# Patient Record
Sex: FEMALE | Race: BLACK OR AFRICAN AMERICAN | NOT HISPANIC OR LATINO | Employment: FULL TIME | URBAN - NONMETROPOLITAN AREA
[De-identification: names, ages, dates, MRNs, and addresses within clinical notes are randomized per-mention and may not be internally consistent; named-entity substitution may affect disease eponyms.]

---

## 2022-04-04 ENCOUNTER — OFFICE VISIT (OUTPATIENT)
Dept: FAMILY MEDICINE CLINIC | Facility: CLINIC | Age: 32
End: 2022-04-04

## 2022-04-04 VITALS
TEMPERATURE: 97.7 F | OXYGEN SATURATION: 98 % | HEART RATE: 94 BPM | WEIGHT: 293 LBS | HEIGHT: 66 IN | DIASTOLIC BLOOD PRESSURE: 86 MMHG | SYSTOLIC BLOOD PRESSURE: 134 MMHG | BODY MASS INDEX: 47.09 KG/M2

## 2022-04-04 DIAGNOSIS — R73.03 PRE-DIABETES: Primary | ICD-10-CM

## 2022-04-04 DIAGNOSIS — E66.01 MORBID OBESITY: ICD-10-CM

## 2022-04-04 PROBLEM — R41.840 INATTENTION: Status: ACTIVE | Noted: 2022-04-04

## 2022-04-04 PROCEDURE — 99213 OFFICE O/P EST LOW 20 MIN: CPT | Performed by: FAMILY MEDICINE

## 2022-04-04 RX ORDER — OMEPRAZOLE 40 MG/1
1 CAPSULE, DELAYED RELEASE ORAL
COMMUNITY
Start: 2022-01-31 | End: 2023-01-03

## 2022-04-04 NOTE — ASSESSMENT & PLAN NOTE
Patient will have A1c repeated today.  If this has worsened we discussed trying Metformin extended release.

## 2022-04-04 NOTE — ASSESSMENT & PLAN NOTE
Patient's (Body mass index is 70.34 kg/m².) indicates that they are morbidly obese (BMI > 40 or > 35 with obesity - related health condition) with health conditions that include diabetes mellitus . Weight is worsening. BMI is is above average; BMI management plan is completed. We discussed low calorie, low carb based diet program, portion control and increasing exercise.

## 2022-04-04 NOTE — PROGRESS NOTES
Date: 2022   Patient Name: Nelson Rendon  : 1990   MRN: [id number]     Chief Complaint:    Chief Complaint   Patient presents with   • Diabetes     Patient stopped ozempic on 2022 due to nausea and vaginal discharge       History of Present Illness: Nelson Rendon is a 31 y.o. female who is here today to follow up for Obesity.  Patient started Saxenda .  She completed 1 month of 0.25 mg and increase 0.5 mg but reports significant nausea and vaginal discharge.  She did not like how this made her feel so she recently discontinued.  She has an appointment with [facility] to meet with the nutritionist and discussed a diet plan.  At this point she is not interested in bariatric surgery.           Review of Systems:   Review of Systems   Constitutional: Negative for chills, fatigue and fever.   Respiratory: Negative for cough and shortness of breath.    Cardiovascular: Negative for chest pain and palpitations.   Gastrointestinal: Negative for abdominal pain, constipation, diarrhea, nausea and vomiting.   Musculoskeletal: Negative for back pain and myalgias.   Neurological: Negative for dizziness and headache.   Psychiatric/Behavioral: Negative for depressed mood. The patient is not nervous/anxious.        Past Medical History:   Past Medical History:   Diagnosis Date   • GERD (gastroesophageal reflux disease)    • Obesity        Past Surgical History:   Past Surgical History:   Procedure Laterality Date   • CHOLECYSTECTOMY         Family History:   Family History   Problem Relation Age of Onset   • Hypertension Father    • Obesity Father    • Diabetes Brother    • Obesity Brother    • Hypertension Brother    • Diabetes Other    • Obesity Other        Social History:   Social History     Socioeconomic History   • Marital status: Single   Tobacco Use   • Smoking status: Never Smoker   • Smokeless tobacco: Never Used   Vaping Use   • Vaping Use: Never used   Substance and  "Sexual Activity   • Alcohol use: Yes     Alcohol/week: 3.0 standard drinks     Types: 3 Glasses of wine per week   • Drug use: Never   • Sexual activity: Defer       Medications:     Current Outpatient Medications:   •  omeprazole (priLOSEC) 40 MG capsule, Take 1 capsule by mouth., Disp: , Rfl:     Allergies:   No Known Allergies    PHQ-2 Total Score: 0   PHQ-9 Total Score: 0     Physical Exam:  Vital Signs:   Vitals:    04/04/22 1507   BP: 134/86   BP Location: Left arm   Patient Position: Sitting   Cuff Size: Large Adult   Pulse: 94   Temp: 97.7 °F (36.5 °C)   TempSrc: Temporal   SpO2: 98%   Weight: (!) 198 kg (435 lb 12.8 oz)   Height: 167.6 cm (66\")   PainSc: 0-No pain     Body mass index is 70.34 kg/m².     Physical Exam  Vitals and nursing note reviewed.   Constitutional:       Appearance: Normal appearance. She is obese.   HENT:      Head: Normocephalic.   Cardiovascular:      Rate and Rhythm: Normal rate and regular rhythm.      Heart sounds: Normal heart sounds.   Pulmonary:      Effort: Pulmonary effort is normal.      Breath sounds: Normal breath sounds.   Neurological:      Mental Status: She is alert and oriented to person, place, and time.   Psychiatric:         Mood and Affect: Mood normal.         Behavior: Behavior normal.           Assessment/Plan:   Diagnoses and all orders for this visit:    1. Pre-diabetes (Primary)  Assessment & Plan:  Patient will have A1c repeated today.  If this has worsened we discussed trying Metformin extended release.    Orders:  -     Hemoglobin A1c; Future    2. Morbid obesity (HCC)  Assessment & Plan:  Patient's (Body mass index is 70.34 kg/m².) indicates that they are morbidly obese (BMI > 40 or > 35 with obesity - related health condition) with health conditions that include diabetes mellitus . Weight is worsening. BMI is is above average; BMI management plan is completed. We discussed low calorie, low carb based diet program, portion control and increasing " exercise.            Follow Up:   Return in about 3 months (around 7/4/2022) for Med Recheck.    Mirna Martínez,   [facility]

## 2022-04-05 LAB — HBA1C MFR BLD: 5.6 % (ref 4.8–5.6)

## 2022-05-09 RX ORDER — FLUCONAZOLE 150 MG/1
TABLET ORAL
Qty: 2 TABLET | Refills: 0 | Status: SHIPPED | OUTPATIENT
Start: 2022-05-09 | End: 2022-10-19

## 2022-10-19 ENCOUNTER — OFFICE VISIT (OUTPATIENT)
Dept: FAMILY MEDICINE CLINIC | Facility: CLINIC | Age: 32
End: 2022-10-19

## 2022-10-19 VITALS
SYSTOLIC BLOOD PRESSURE: 122 MMHG | OXYGEN SATURATION: 95 % | HEIGHT: 66 IN | BODY MASS INDEX: 47.09 KG/M2 | WEIGHT: 293 LBS | TEMPERATURE: 97.7 F | DIASTOLIC BLOOD PRESSURE: 88 MMHG | HEART RATE: 104 BPM

## 2022-10-19 DIAGNOSIS — E66.01 MORBID OBESITY: ICD-10-CM

## 2022-10-19 DIAGNOSIS — R73.03 PRE-DIABETES: Primary | ICD-10-CM

## 2022-10-19 DIAGNOSIS — R53.82 CHRONIC FATIGUE: ICD-10-CM

## 2022-10-19 PROCEDURE — 99213 OFFICE O/P EST LOW 20 MIN: CPT | Performed by: FAMILY MEDICINE

## 2022-10-19 RX ORDER — PHENTERMINE HYDROCHLORIDE 15 MG/1
15 CAPSULE ORAL DAILY
COMMUNITY
Start: 2022-09-23 | End: 2023-03-08

## 2022-10-19 NOTE — PROGRESS NOTES
Date: 10/19/2022   Patient Name: Nelson Rendon  : 1990   MRN: [id number]     Chief Complaint:    Chief Complaint   Patient presents with   • Med Check       History of Present Illness: Nelson Rendon is a 32 y.o. female who is here today to follow up for Prediabetes.  She is due for repeat lab work.  She is currently following with weight loss specialist and is on phentermine to help with weight loss.           Review of Systems:   Review of Systems   Constitutional: Negative for chills, fatigue and fever.   Respiratory: Negative for cough and shortness of breath.    Cardiovascular: Negative for chest pain and palpitations.   Gastrointestinal: Negative for abdominal pain, constipation, diarrhea, nausea and vomiting.   Musculoskeletal: Negative for back pain and myalgias.   Neurological: Negative for dizziness and headache.   Psychiatric/Behavioral: Negative for depressed mood. The patient is not nervous/anxious.        Past Medical History:   Past Medical History:   Diagnosis Date   • GERD (gastroesophageal reflux disease)    • Obesity        Past Surgical History:   Past Surgical History:   Procedure Laterality Date   • CHOLECYSTECTOMY         Family History:   Family History   Problem Relation Age of Onset   • Hypertension Father    • Obesity Father    • Diabetes Brother    • Obesity Brother    • Hypertension Brother    • Diabetes Other    • Obesity Other        Social History:   Social History     Socioeconomic History   • Marital status: Single   Tobacco Use   • Smoking status: Never   • Smokeless tobacco: Never   Vaping Use   • Vaping Use: Never used   Substance and Sexual Activity   • Alcohol use: Yes     Alcohol/week: 3.0 standard drinks     Types: 3 Glasses of wine per week   • Drug use: Never   • Sexual activity: Defer       Medications:     Current Outpatient Medications:   •  omeprazole (priLOSEC) 40 MG capsule, Take 1 capsule by mouth., Disp: , Rfl:   •  phentermine 15 MG capsule, Take 1 capsule  "by mouth Daily., Disp: , Rfl:     Allergies:   No Known Allergies    PHQ-2 Total Score: 0   PHQ-9 Total Score: 0     Physical Exam:  Vital Signs:   Vitals:    10/19/22 1420   BP: 122/88   BP Location: Left arm   Patient Position: Sitting   Cuff Size: Large Adult   Pulse: 104   Temp: 97.7 °F (36.5 °C)   TempSrc: Temporal   SpO2: 95%   Weight: (!) 192 kg (422 lb 11.2 oz)   Height: 167.6 cm (66\")     Body mass index is 68.23 kg/m².     Physical Exam  Vitals and nursing note reviewed.   Constitutional:       Appearance: Normal appearance. She is obese.   Cardiovascular:      Rate and Rhythm: Normal rate and regular rhythm.      Heart sounds: Normal heart sounds. No murmur heard.  Pulmonary:      Effort: Pulmonary effort is normal.      Breath sounds: Normal breath sounds. No wheezing.   Abdominal:      General: Bowel sounds are normal.      Palpations: Abdomen is soft.   Skin:     General: Skin is warm.   Neurological:      Mental Status: She is alert and oriented to person, place, and time. Mental status is at baseline.   Psychiatric:         Mood and Affect: Mood normal.         Behavior: Behavior normal.           Assessment/Plan:   Diagnoses and all orders for this visit:    1. Pre-diabetes (Primary)  Assessment & Plan:  Stable, will reassess and labs today    Orders:  -     Hemoglobin A1c; Future  -     CBC Auto Differential; Future  -     Comprehensive Metabolic Panel; Future  -     Hemoglobin A1c  -     CBC Auto Differential  -     Comprehensive Metabolic Panel    2. Chronic fatigue  Assessment & Plan:  Additional labs today    Orders:  -     TSH; Future  -     T4, Free; Future  -     TSH  -     T4, Free    3. Morbid obesity (HCC)  Assessment & Plan:  Patient's (Body mass index is 68.23 kg/m².) indicates that they are morbidly obese (BMI > 40 or > 35 with obesity - related health condition) with health conditions that include GERD . Weight is improving with treatment. BMI is is above average; BMI management plan " is completed. We discussed portion control, increasing exercise and pharmacologic options including Phentermine.  We did discuss the potential use of Mounjaro depending on lab work.            Follow Up:   Return in about 6 months (around 4/19/2023) for Annual physical.    Mirna Martínez, DO  [facility]

## 2022-10-20 LAB
ALBUMIN SERPL-MCNC: 4 G/DL (ref 3.8–4.8)
ALBUMIN/GLOB SERPL: 1.1 {RATIO} (ref 1.2–2.2)
ALP SERPL-CCNC: 74 IU/L (ref 44–121)
ALT SERPL-CCNC: 11 IU/L (ref 0–32)
AST SERPL-CCNC: 23 IU/L (ref 0–40)
BASOPHILS # BLD AUTO: 0 X10E3/UL (ref 0–0.2)
BASOPHILS NFR BLD AUTO: 0 %
BILIRUB SERPL-MCNC: 0.3 MG/DL (ref 0–1.2)
BUN SERPL-MCNC: 10 MG/DL (ref 6–20)
BUN/CREAT SERPL: 11 (ref 9–23)
CALCIUM SERPL-MCNC: 9.8 MG/DL (ref 8.7–10.2)
CHLORIDE SERPL-SCNC: 99 MMOL/L (ref 96–106)
CO2 SERPL-SCNC: 25 MMOL/L (ref 20–29)
CREAT SERPL-MCNC: 0.93 MG/DL (ref 0.57–1)
EGFRCR SERPLBLD CKD-EPI 2021: 84 ML/MIN/1.73
EOSINOPHIL # BLD AUTO: 0.1 X10E3/UL (ref 0–0.4)
EOSINOPHIL NFR BLD AUTO: 2 %
ERYTHROCYTE [DISTWIDTH] IN BLOOD BY AUTOMATED COUNT: 13.4 % (ref 11.7–15.4)
GLOBULIN SER CALC-MCNC: 3.5 G/DL (ref 1.5–4.5)
GLUCOSE SERPL-MCNC: 100 MG/DL (ref 70–99)
HBA1C MFR BLD: 5.9 % (ref 4.8–5.6)
HCT VFR BLD AUTO: 41 % (ref 34–46.6)
HGB BLD-MCNC: 13.2 G/DL (ref 11.1–15.9)
IMM GRANULOCYTES # BLD AUTO: 0 X10E3/UL (ref 0–0.1)
IMM GRANULOCYTES NFR BLD AUTO: 0 %
LYMPHOCYTES # BLD AUTO: 3 X10E3/UL (ref 0.7–3.1)
LYMPHOCYTES NFR BLD AUTO: 42 %
MCH RBC QN AUTO: 25.3 PG (ref 26.6–33)
MCHC RBC AUTO-ENTMCNC: 32.2 G/DL (ref 31.5–35.7)
MCV RBC AUTO: 79 FL (ref 79–97)
MONOCYTES # BLD AUTO: 0.5 X10E3/UL (ref 0.1–0.9)
MONOCYTES NFR BLD AUTO: 7 %
NEUTROPHILS # BLD AUTO: 3.5 X10E3/UL (ref 1.4–7)
NEUTROPHILS NFR BLD AUTO: 49 %
PLATELET # BLD AUTO: 353 X10E3/UL (ref 150–450)
POTASSIUM SERPL-SCNC: 4.5 MMOL/L (ref 3.5–5.2)
PROT SERPL-MCNC: 7.5 G/DL (ref 6–8.5)
RBC # BLD AUTO: 5.22 X10E6/UL (ref 3.77–5.28)
SODIUM SERPL-SCNC: 137 MMOL/L (ref 134–144)
T4 FREE SERPL-MCNC: 0.98 NG/DL (ref 0.82–1.77)
TSH SERPL DL<=0.005 MIU/L-ACNC: 2.28 UIU/ML (ref 0.45–4.5)
WBC # BLD AUTO: 7.2 X10E3/UL (ref 3.4–10.8)

## 2022-10-20 NOTE — ASSESSMENT & PLAN NOTE
Patient's (Body mass index is 68.23 kg/m².) indicates that they are morbidly obese (BMI > 40 or > 35 with obesity - related health condition) with health conditions that include GERD . Weight is improving with treatment. BMI is is above average; BMI management plan is completed. We discussed portion control, increasing exercise and pharmacologic options including Phentermine.  We did discuss the potential use of Mounjaro depending on lab work.

## 2023-01-03 RX ORDER — OMEPRAZOLE 40 MG/1
CAPSULE, DELAYED RELEASE ORAL
Qty: 90 CAPSULE | Refills: 1 | Status: SHIPPED | OUTPATIENT
Start: 2023-01-03

## 2023-02-08 ENCOUNTER — OFFICE VISIT (OUTPATIENT)
Dept: FAMILY MEDICINE CLINIC | Facility: CLINIC | Age: 33
End: 2023-02-08
Payer: COMMERCIAL

## 2023-02-08 VITALS
WEIGHT: 293 LBS | DIASTOLIC BLOOD PRESSURE: 88 MMHG | HEART RATE: 92 BPM | BODY MASS INDEX: 47.09 KG/M2 | HEIGHT: 66 IN | OXYGEN SATURATION: 99 % | SYSTOLIC BLOOD PRESSURE: 150 MMHG

## 2023-02-08 DIAGNOSIS — F90.2 ATTENTION DEFICIT HYPERACTIVITY DISORDER (ADHD), COMBINED TYPE: Primary | ICD-10-CM

## 2023-02-08 PROCEDURE — 99214 OFFICE O/P EST MOD 30 MIN: CPT | Performed by: FAMILY MEDICINE

## 2023-02-08 RX ORDER — DEXTROAMPHETAMINE SACCHARATE, AMPHETAMINE ASPARTATE MONOHYDRATE, DEXTROAMPHETAMINE SULFATE AND AMPHETAMINE SULFATE 3.75; 3.75; 3.75; 3.75 MG/1; MG/1; MG/1; MG/1
15 CAPSULE, EXTENDED RELEASE ORAL EVERY MORNING
Qty: 30 CAPSULE | Refills: 0 | Status: SHIPPED | OUTPATIENT
Start: 2023-02-08 | End: 2023-03-08

## 2023-02-08 NOTE — ASSESSMENT & PLAN NOTE
Psychological condition is newly identified.  We discussed different medication options and patient will start Adderall 15 mg XR daily.  Side effects discussed  Psychological condition  will be reassessed in 4 weeks.    I spent 30 minutes reviewing recent consultation notes, discussing treatment options, answering patient's questions and concerns, and on documentation.

## 2023-02-08 NOTE — PROGRESS NOTES
Date: 2023   Patient Name: Nelson Rendon  : 1990   MRN: [id number]     Chief Complaint:    Chief Complaint   Patient presents with   • ADHD     Notes are in chart from appointment       History of Present Illness: Nelson Rendon is a 32 y.o. female who is here today to follow up for Recent ADHD evaluation.  Patient was diagnosed with ADHD by Laurence and Christos and is here to discuss medication options today.  She has been on phentermine in the past for weight loss and tolerated that well.  She will not be taking phentermine and a stimulant ADHD medication.           Review of Systems:   Review of Systems   Constitutional: Negative for chills, fatigue and fever.   Respiratory: Negative for cough and shortness of breath.    Cardiovascular: Negative for chest pain and palpitations.   Gastrointestinal: Negative for abdominal pain, constipation, diarrhea, nausea and vomiting.   Musculoskeletal: Negative for back pain and myalgias.   Neurological: Negative for dizziness and headache.   Psychiatric/Behavioral: Positive for decreased concentration and positive for hyperactivity. Negative for depressed mood. The patient is not nervous/anxious.        Past Medical History:   Past Medical History:   Diagnosis Date   • GERD (gastroesophageal reflux disease)    • Obesity        Past Surgical History:   Past Surgical History:   Procedure Laterality Date   • CHOLECYSTECTOMY         Family History:   Family History   Problem Relation Age of Onset   • Hypertension Father    • Obesity Father    • Diabetes Brother    • Obesity Brother    • Hypertension Brother    • Diabetes Other    • Obesity Other        Social History:   Social History     Socioeconomic History   • Marital status: Single   Tobacco Use   • Smoking status: Never   • Smokeless tobacco: Never   Vaping Use   • Vaping Use: Never used   Substance and Sexual Activity   • Alcohol use: Yes     Alcohol/week: 3.0 standard drinks     Types: 3 Glasses of wine per  "week   • Drug use: Never   • Sexual activity: Defer       Medications:     Current Outpatient Medications:   •  omeprazole (priLOSEC) 40 MG capsule, TAKE ONE CAPSULE BY MOUTH DAILY BEFORE A MEAL, Disp: 90 capsule, Rfl: 1  •  amphetamine-dextroamphetamine XR (Adderall XR) 15 MG 24 hr capsule, Take 1 capsule by mouth Every Morning, Disp: 30 capsule, Rfl: 0  •  phentermine 15 MG capsule, Take 1 capsule by mouth Daily., Disp: , Rfl:     Allergies:   No Known Allergies    PHQ-2 Total Score:     PHQ-9 Total Score:       Physical Exam:  Vital Signs:   Vitals:    02/08/23 1527   BP: 150/88   BP Location: Left arm   Patient Position: Sitting   Cuff Size: Adult   Pulse: 92   SpO2: 99%   Weight: (!) 193 kg (426 lb)   Height: 167.6 cm (66\")     Body mass index is 68.76 kg/m².     Physical Exam  Vitals and nursing note reviewed.   Constitutional:       Appearance: Normal appearance. She is obese.   HENT:      Head: Normocephalic.   Pulmonary:      Effort: Pulmonary effort is normal.   Neurological:      Mental Status: She is alert and oriented to person, place, and time.   Psychiatric:         Mood and Affect: Mood normal.         Behavior: Behavior normal.           Assessment/Plan:   Diagnoses and all orders for this visit:    1. Attention deficit hyperactivity disorder (ADHD), combined type (Primary)  Assessment & Plan:  Psychological condition is newly identified.  We discussed different medication options and patient will start Adderall 15 mg XR daily.  Side effects discussed  Psychological condition  will be reassessed in 4 weeks.    I spent 30 minutes reviewing recent consultation notes, discussing treatment options, answering patient's questions and concerns, and on documentation.    Orders:  -     amphetamine-dextroamphetamine XR (Adderall XR) 15 MG 24 hr capsule; Take 1 capsule by mouth Every Morning  Dispense: 30 capsule; Refill: 0         Follow Up:   Return in about 1 month (around 3/8/2023) for Med " Derrick.    Mirna Martínez, DO  [facility]

## 2023-03-08 ENCOUNTER — OFFICE VISIT (OUTPATIENT)
Dept: FAMILY MEDICINE CLINIC | Facility: CLINIC | Age: 33
End: 2023-03-08
Payer: COMMERCIAL

## 2023-03-08 VITALS
HEIGHT: 66 IN | SYSTOLIC BLOOD PRESSURE: 118 MMHG | DIASTOLIC BLOOD PRESSURE: 82 MMHG | HEART RATE: 75 BPM | OXYGEN SATURATION: 98 % | BODY MASS INDEX: 47.09 KG/M2 | WEIGHT: 293 LBS | TEMPERATURE: 97.5 F

## 2023-03-08 DIAGNOSIS — F90.2 ATTENTION DEFICIT HYPERACTIVITY DISORDER (ADHD), COMBINED TYPE: Primary | ICD-10-CM

## 2023-03-08 DIAGNOSIS — E66.01 MORBID OBESITY: ICD-10-CM

## 2023-03-08 PROCEDURE — 99213 OFFICE O/P EST LOW 20 MIN: CPT | Performed by: FAMILY MEDICINE

## 2023-03-08 RX ORDER — DEXTROAMPHETAMINE SACCHARATE, AMPHETAMINE ASPARTATE, DEXTROAMPHETAMINE SULFATE AND AMPHETAMINE SULFATE 2.5; 2.5; 2.5; 2.5 MG/1; MG/1; MG/1; MG/1
10 TABLET ORAL DAILY
Qty: 30 TABLET | Refills: 0 | Status: SHIPPED | OUTPATIENT
Start: 2023-03-08

## 2023-03-08 RX ORDER — DEXTROAMPHETAMINE SACCHARATE, AMPHETAMINE ASPARTATE MONOHYDRATE, DEXTROAMPHETAMINE SULFATE AND AMPHETAMINE SULFATE 5; 5; 5; 5 MG/1; MG/1; MG/1; MG/1
20 CAPSULE, EXTENDED RELEASE ORAL EVERY MORNING
Qty: 30 CAPSULE | Refills: 0 | Status: SHIPPED | OUTPATIENT
Start: 2023-03-08 | End: 2023-03-09 | Stop reason: SDUPTHER

## 2023-03-08 NOTE — PROGRESS NOTES
Date: 2023   Patient Name: Nelson Rendon  : 1990   MRN: [id number]     Chief Complaint:    Chief Complaint   Patient presents with   • ADHD     Medication follow up        History of Present Illness: Nelson Rendon is a 32 y.o. female who is here today to follow up for ADHD.  She reports improvement in symptoms after starting Adderall 15 mg XR.  Friends have also noticed a difference in her ability to focus.  She does report the medication wears off in the early afternoon she has difficulty completing her afternoon tasks at work and at home.  She would like to discuss potentially increasing the dose.  She denies side effects to the medication.           Review of Systems:   Review of Systems   Constitutional: Negative for chills, fatigue and fever.   Respiratory: Negative for cough and shortness of breath.    Cardiovascular: Negative for chest pain and palpitations.   Gastrointestinal: Negative for abdominal pain, constipation, diarrhea, nausea and vomiting.   Musculoskeletal: Negative for back pain and myalgias.   Neurological: Negative for dizziness and headache.   Psychiatric/Behavioral: Positive for decreased concentration. Negative for depressed mood. The patient is not nervous/anxious.        Past Medical History:   Past Medical History:   Diagnosis Date   • GERD (gastroesophageal reflux disease)    • Obesity        Past Surgical History:   Past Surgical History:   Procedure Laterality Date   • CHOLECYSTECTOMY         Family History:   Family History   Problem Relation Age of Onset   • Hypertension Father    • Obesity Father    • Diabetes Brother    • Obesity Brother    • Hypertension Brother    • Diabetes Other    • Obesity Other        Social History:   Social History     Socioeconomic History   • Marital status: Single   Tobacco Use   • Smoking status: Never   • Smokeless tobacco: Never   Vaping Use   • Vaping Use: Never used   Substance and Sexual Activity   • Alcohol use: Yes      "Alcohol/week: 3.0 standard drinks     Types: 3 Glasses of wine per week   • Drug use: Never   • Sexual activity: Defer       Medications:     Current Outpatient Medications:   •  omeprazole (priLOSEC) 40 MG capsule, TAKE ONE CAPSULE BY MOUTH DAILY BEFORE A MEAL, Disp: 90 capsule, Rfl: 1  •  amphetamine-dextroamphetamine (Adderall) 10 MG tablet, Take 1 tablet by mouth Daily. At 2 pm, Disp: 30 tablet, Rfl: 0  •  amphetamine-dextroamphetamine XR (Adderall XR) 20 MG 24 hr capsule, Take 1 capsule by mouth Every Morning, Disp: 30 capsule, Rfl: 0    Allergies:   No Known Allergies    PHQ-2 Total Score: 0   PHQ-9 Total Score: 0     Physical Exam:  Vital Signs:   Vitals:    03/08/23 1537   BP: 118/82   Pulse: 75   Temp: 97.5 °F (36.4 °C)   TempSrc: Temporal   SpO2: 98%   Weight: (!) 192 kg (423 lb)   Height: 167.6 cm (65.98\")     Body mass index is 68.31 kg/m².     Physical Exam  Vitals and nursing note reviewed.   Constitutional:       Appearance: Normal appearance. She is obese.   HENT:      Head: Normocephalic.   Pulmonary:      Effort: Pulmonary effort is normal.   Neurological:      Mental Status: She is alert and oriented to person, place, and time.   Psychiatric:         Mood and Affect: Mood normal.         Behavior: Behavior normal.           Assessment/Plan:   Diagnoses and all orders for this visit:    1. Attention deficit hyperactivity disorder (ADHD), combined type (Primary)  Assessment & Plan:  Psychological condition is improving with treatment.  Will increase Adderall XR to 20 mg in the morning and add an immediate release 10 mg tablets around 2 PM.  Side effects discussed  Psychological condition  will be reassessed in 4 weeks.    Orders:  -     amphetamine-dextroamphetamine XR (Adderall XR) 20 MG 24 hr capsule; Take 1 capsule by mouth Every Morning  Dispense: 30 capsule; Refill: 0  -     amphetamine-dextroamphetamine (Adderall) 10 MG tablet; Take 1 tablet by mouth Daily. At 2 pm  Dispense: 30 tablet; " Refill: 0    2. Morbid obesity (HCC)  Assessment & Plan:  Patient's (Body mass index is 68.31 kg/m².) indicates that they are morbidly/severely obese (BMI > 40 or > 35 with obesity - related health condition) with health conditions that include none . Weight is unchanged. BMI  is above average; BMI management plan is completed. We discussed portion control and increasing exercise.            Follow Up:   Return in about 1 month (around 4/8/2023) for Med Recheck.    Mirna Martínez,   [facility]

## 2023-03-08 NOTE — ASSESSMENT & PLAN NOTE
Psychological condition is improving with treatment.  Will increase Adderall XR to 20 mg in the morning and add an immediate release 10 mg tablets around 2 PM.  Side effects discussed  Psychological condition  will be reassessed in 4 weeks.

## 2023-03-09 DIAGNOSIS — F90.2 ATTENTION DEFICIT HYPERACTIVITY DISORDER (ADHD), COMBINED TYPE: ICD-10-CM

## 2023-03-09 RX ORDER — DEXTROAMPHETAMINE SACCHARATE, AMPHETAMINE ASPARTATE MONOHYDRATE, DEXTROAMPHETAMINE SULFATE AND AMPHETAMINE SULFATE 5; 5; 5; 5 MG/1; MG/1; MG/1; MG/1
20 CAPSULE, EXTENDED RELEASE ORAL EVERY MORNING
Qty: 30 CAPSULE | Refills: 0 | Status: SHIPPED | OUTPATIENT
Start: 2023-03-09 | End: 2023-03-16

## 2023-03-09 NOTE — TELEPHONE ENCOUNTER
Caller: Nelson Rendon    Relationship: Self    Best call back number: [phone]    Requested Prescriptions:   Requested Prescriptions     Pending Prescriptions Disp Refills   • amphetamine-dextroamphetamine XR (Adderall XR) 20 MG 24 hr capsule 30 capsule 0     Sig: Take 1 capsule by mouth Every Morning        Pharmacy where request should be sent: [facility] [address] - [phone]  - [phone] FX     Additional details provided by patient: [facility] DID NOT HAVE THIS PLEASE CALL IN TO [facility]     Does the patient have less than a 3 day supply:  [x] Yes  [] No    Would you like a call back once the refill request has been completed: [] Yes [x] No    If the office needs to give you a call back, can they leave a voicemail: [] Yes [x] No    Rhonda Simms Rep   03/09/23 14:24 EST

## 2023-03-16 ENCOUNTER — TELEMEDICINE (OUTPATIENT)
Dept: FAMILY MEDICINE CLINIC | Facility: CLINIC | Age: 33
End: 2023-03-16
Payer: COMMERCIAL

## 2023-03-16 ENCOUNTER — TELEPHONE (OUTPATIENT)
Dept: FAMILY MEDICINE CLINIC | Facility: CLINIC | Age: 33
End: 2023-03-16

## 2023-03-16 VITALS — HEIGHT: 65 IN | BODY MASS INDEX: 48.82 KG/M2 | WEIGHT: 293 LBS

## 2023-03-16 DIAGNOSIS — F90.2 ATTENTION DEFICIT HYPERACTIVITY DISORDER (ADHD), COMBINED TYPE: Primary | ICD-10-CM

## 2023-03-16 PROCEDURE — 99213 OFFICE O/P EST LOW 20 MIN: CPT | Performed by: FAMILY MEDICINE

## 2023-03-16 RX ORDER — DEXTROAMPHETAMINE SACCHARATE, AMPHETAMINE ASPARTATE MONOHYDRATE, DEXTROAMPHETAMINE SULFATE AND AMPHETAMINE SULFATE 3.75; 3.75; 3.75; 3.75 MG/1; MG/1; MG/1; MG/1
15 CAPSULE, EXTENDED RELEASE ORAL EVERY MORNING
Qty: 30 CAPSULE | Refills: 0 | Status: SHIPPED | OUTPATIENT
Start: 2023-03-16

## 2023-03-16 NOTE — ASSESSMENT & PLAN NOTE
Psychological condition is improving with treatment.  Patient had heart palpitations with increasing Adderall XR.  We will go back down to the 15 mg XR.  I have sent the prescription to her pharmacy and she will bring her old prescription for disposal.  Psychological condition  will be reassessed at the next regular appointment.

## 2023-03-16 NOTE — PROGRESS NOTES
Patient was seen today through synchronous audio/video technology. Verbal consent was obtained. The patient was located at home. Dr. Martínez was located at [facility] in [place], [place]. Vitals signs were obtained by patient and recorded.          Date: 2023   Patient Name: Nelson Rendon  : 1990   MRN: [id number]     Chief Complaint:    Chief Complaint   Patient presents with   • ADHD     Patient states she is having heart palpitations with increased dosage of Adderall        History of Present Illness: Nelson Rendon is a 32 y.o. female who is here today to follow up for ADHD.  Adderall was increased to 20 mg XR from 15 mg XR last week.  She has taken the medication for about 5 days and reports significant heart palpitations every time she has taken it.  She did try one day of the left over 15 mg XR with her new immediate release 10 mg in the afternoon and states she did well with that.  She would like to go back down to the 15 mg XR.           Review of Systems:   Review of Systems   Constitutional: Negative for chills, fatigue and fever.   Respiratory: Negative for cough and shortness of breath.    Cardiovascular: Positive for palpitations. Negative for chest pain.   Gastrointestinal: Negative for abdominal pain, constipation, diarrhea, nausea and vomiting.   Musculoskeletal: Negative for back pain and myalgias.   Neurological: Negative for dizziness and headache.   Psychiatric/Behavioral: Negative for depressed mood. The patient is not nervous/anxious.        Past Medical History:   Past Medical History:   Diagnosis Date   • ADHD (attention deficit hyperactivity disorder)    • GERD (gastroesophageal reflux disease)    • Obesity        Past Surgical History:   Past Surgical History:   Procedure Laterality Date   • CHOLECYSTECTOMY         Family History:   Family History   Problem Relation Age of Onset   • Hypertension Father    • Obesity Father    • Diabetes Brother    • Obesity  "Brother    • Hypertension Brother    • Diabetes Other    • Obesity Other        Social History:   Social History     Socioeconomic History   • Marital status: Single   Tobacco Use   • Smoking status: Never   • Smokeless tobacco: Never   Vaping Use   • Vaping Use: Never used   Substance and Sexual Activity   • Alcohol use: Yes     Alcohol/week: 3.0 standard drinks     Types: 3 Glasses of wine per week   • Drug use: Never   • Sexual activity: Defer       Medications:     Current Outpatient Medications:   •  amphetamine-dextroamphetamine (Adderall) 10 MG tablet, Take 1 tablet by mouth Daily. At 2 pm, Disp: 30 tablet, Rfl: 0  •  omeprazole (priLOSEC) 40 MG capsule, TAKE ONE CAPSULE BY MOUTH DAILY BEFORE A MEAL, Disp: 90 capsule, Rfl: 1  •  amphetamine-dextroamphetamine XR (Adderall XR) 15 MG 24 hr capsule, Take 1 capsule by mouth Every Morning, Disp: 30 capsule, Rfl: 0    Allergies:   No Known Allergies    PHQ-2 Total Score:     PHQ-9 Total Score:       Physical Exam:  Vital Signs:   Vitals:    03/16/23 1000   Weight: (!) 192 kg (423 lb)   Height: 165.1 cm (65\")     Body mass index is 70.39 kg/m².     Physical Exam  Vitals and nursing note reviewed.   Constitutional:       Appearance: Normal appearance.   HENT:      Head: Normocephalic.   Pulmonary:      Effort: Pulmonary effort is normal.   Neurological:      Mental Status: She is alert and oriented to person, place, and time.   Psychiatric:         Mood and Affect: Mood normal.         Behavior: Behavior normal.           Assessment/Plan:   Diagnoses and all orders for this visit:    1. Attention deficit hyperactivity disorder (ADHD), combined type (Primary)  Assessment & Plan:  Psychological condition is improving with treatment.  Patient had heart palpitations with increasing Adderall XR.  We will go back down to the 15 mg XR.  I have sent the prescription to her pharmacy and she will bring her old prescription for disposal.  Psychological condition  will be " reassessed at the next regular appointment.    Orders:  -     amphetamine-dextroamphetamine XR (Adderall XR) 15 MG 24 hr capsule; Take 1 capsule by mouth Every Morning  Dispense: 30 capsule; Refill: 0         Follow Up:   Return for Already has appointment.    Mirna Martínez DO  [facility]

## 2023-04-10 ENCOUNTER — TELEPHONE (OUTPATIENT)
Dept: FAMILY MEDICINE CLINIC | Facility: CLINIC | Age: 33
End: 2023-04-10

## 2023-04-10 DIAGNOSIS — F90.2 ATTENTION DEFICIT HYPERACTIVITY DISORDER (ADHD), COMBINED TYPE: ICD-10-CM

## 2023-04-10 RX ORDER — DEXTROAMPHETAMINE SACCHARATE, AMPHETAMINE ASPARTATE, DEXTROAMPHETAMINE SULFATE AND AMPHETAMINE SULFATE 2.5; 2.5; 2.5; 2.5 MG/1; MG/1; MG/1; MG/1
10 TABLET ORAL DAILY
Qty: 30 TABLET | Refills: 0 | Status: SHIPPED | OUTPATIENT
Start: 2023-04-10 | End: 2023-04-11 | Stop reason: SDUPTHER

## 2023-04-10 RX ORDER — DEXTROAMPHETAMINE SACCHARATE, AMPHETAMINE ASPARTATE MONOHYDRATE, DEXTROAMPHETAMINE SULFATE AND AMPHETAMINE SULFATE 3.75; 3.75; 3.75; 3.75 MG/1; MG/1; MG/1; MG/1
15 CAPSULE, EXTENDED RELEASE ORAL EVERY MORNING
Qty: 30 CAPSULE | Refills: 0 | Status: SHIPPED | OUTPATIENT
Start: 2023-04-10 | End: 2023-04-11 | Stop reason: SDUPTHER

## 2023-04-10 RX ORDER — DEXTROAMPHETAMINE SACCHARATE, AMPHETAMINE ASPARTATE MONOHYDRATE, DEXTROAMPHETAMINE SULFATE AND AMPHETAMINE SULFATE 3.75; 3.75; 3.75; 3.75 MG/1; MG/1; MG/1; MG/1
15 CAPSULE, EXTENDED RELEASE ORAL EVERY MORNING
Qty: 30 CAPSULE | Refills: 0 | Status: CANCELLED | OUTPATIENT
Start: 2023-04-10

## 2023-04-10 NOTE — TELEPHONE ENCOUNTER
Caller: Nelson Rendon    Relationship to patient: Self    Best call back number: [phone]    Patient is needing: PATIENT STATED THAT SHE WOULD LIKE TO SPEAK WITH HALIMA OR CLINICAL ABOUT MEDICATIONS REFILLS AND IF PATIENT WOULD NEED TO BE SEEN AGAIN     PLEASE ADVISE

## 2023-04-10 NOTE — TELEPHONE ENCOUNTER
Caller: Nelson Rendon    Relationship to patient: Self    Best call back number: [phone]    Patient is needing: PATIENT REQUESTING IF PROVIDER OR NURSE CAN RESEND HER   amphetamine-dextroamphetamine (Adderall) 10 MG tablet     amphetamine-dextroamphetamine XR (Adderall XR) 15 MG 24 hr capsule     TO:    [id number] [place], [place] [facility] [address]  [facility] [address] [facility] [address]  & [address] - [phone]  - [phone]   [phone]      STATED MELE DOES NOT HAVE HER PRESCRIPTIONS IN STOCK

## 2023-04-11 DIAGNOSIS — F90.2 ATTENTION DEFICIT HYPERACTIVITY DISORDER (ADHD), COMBINED TYPE: ICD-10-CM

## 2023-04-11 RX ORDER — DEXTROAMPHETAMINE SACCHARATE, AMPHETAMINE ASPARTATE, DEXTROAMPHETAMINE SULFATE AND AMPHETAMINE SULFATE 2.5; 2.5; 2.5; 2.5 MG/1; MG/1; MG/1; MG/1
10 TABLET ORAL DAILY
Qty: 30 TABLET | Refills: 0 | Status: SHIPPED | OUTPATIENT
Start: 2023-04-11

## 2023-04-11 RX ORDER — DEXTROAMPHETAMINE SACCHARATE, AMPHETAMINE ASPARTATE MONOHYDRATE, DEXTROAMPHETAMINE SULFATE AND AMPHETAMINE SULFATE 3.75; 3.75; 3.75; 3.75 MG/1; MG/1; MG/1; MG/1
15 CAPSULE, EXTENDED RELEASE ORAL EVERY MORNING
Qty: 30 CAPSULE | Refills: 0 | Status: CANCELLED | OUTPATIENT
Start: 2023-04-11

## 2023-04-11 RX ORDER — DEXTROAMPHETAMINE SACCHARATE, AMPHETAMINE ASPARTATE MONOHYDRATE, DEXTROAMPHETAMINE SULFATE AND AMPHETAMINE SULFATE 3.75; 3.75; 3.75; 3.75 MG/1; MG/1; MG/1; MG/1
15 CAPSULE, EXTENDED RELEASE ORAL EVERY MORNING
Qty: 30 CAPSULE | Refills: 0 | Status: SHIPPED | OUTPATIENT
Start: 2023-04-11

## 2023-04-26 ENCOUNTER — TELEPHONE (OUTPATIENT)
Dept: FAMILY MEDICINE CLINIC | Facility: CLINIC | Age: 33
End: 2023-04-26
Payer: COMMERCIAL

## 2023-05-02 NOTE — TELEPHONE ENCOUNTER
Called patient and  left message for patient to call back   
Caller: Nelson Rendon    Relationship to patient: Self    Best call back number: [phone]    Chief complaint: CHEST PAIN, LOW POTASSIUM    Patient directed to call 911 or go to their nearest emergency room.     Patient verbalized understanding: [x] Yes  [] No  If no, why?  
Spoke with patient and the chest pain has subsided, she has been taking heartburn medication.  
show

## 2023-05-16 DIAGNOSIS — F90.2 ATTENTION DEFICIT HYPERACTIVITY DISORDER (ADHD), COMBINED TYPE: ICD-10-CM

## 2023-05-16 RX ORDER — DEXTROAMPHETAMINE SACCHARATE, AMPHETAMINE ASPARTATE MONOHYDRATE, DEXTROAMPHETAMINE SULFATE AND AMPHETAMINE SULFATE 3.75; 3.75; 3.75; 3.75 MG/1; MG/1; MG/1; MG/1
15 CAPSULE, EXTENDED RELEASE ORAL EVERY MORNING
Qty: 30 CAPSULE | Refills: 0 | Status: SHIPPED | OUTPATIENT
Start: 2023-05-16

## 2023-05-16 NOTE — TELEPHONE ENCOUNTER
PT CALLED TO CHECK ON STATUS FOR RX REFILL.    PLEASE ADVISE.cALL BACK:[phone]    [id number] - [place], [place] - [address] [facility] [address]  & [address] - [phone]  - [phone] FX

## 2023-05-16 NOTE — TELEPHONE ENCOUNTER
Caller: Nelson Rendon    Relationship: Self    Best call back number: [phone]    Requested Prescriptions:   Requested Prescriptions     Pending Prescriptions Disp Refills   • amphetamine-dextroamphetamine XR (Adderall XR) 15 MG 24 hr capsule 30 capsule 0     Sig: Take 1 capsule by mouth Every Morning        Pharmacy where request should be sent: [id number] - [place], [place] - [address] [place] [address] [facility] [address] [facility] [address]  & [address] [facility] [phone] [facility] [phone] FX     Last office visit with prescribing clinician: 3/8/2023   Last telemedicine visit with prescribing clinician: 4/26/2023   Next office visit with prescribing clinician: Visit date not found     Additional details provided by patient: PATIENT IS COMPLETELY OUT    Does the patient have less than a 3 day supply:  [x] Yes  [] No    Would you like a call back once the refill request has been completed: [x] Yes [] No    If the office needs to give you a call back, can they leave a voicemail: [x] Yes [] No    Rhonda Holden Rep   05/16/23 10:59 EDT

## 2023-06-16 ENCOUNTER — OFFICE VISIT (OUTPATIENT)
Dept: FAMILY MEDICINE CLINIC | Facility: CLINIC | Age: 33
End: 2023-06-16
Payer: COMMERCIAL

## 2023-06-16 VITALS
WEIGHT: 293 LBS | SYSTOLIC BLOOD PRESSURE: 122 MMHG | TEMPERATURE: 97.1 F | HEART RATE: 94 BPM | BODY MASS INDEX: 48.82 KG/M2 | HEIGHT: 65 IN | DIASTOLIC BLOOD PRESSURE: 86 MMHG | OXYGEN SATURATION: 98 %

## 2023-06-16 DIAGNOSIS — F90.2 ATTENTION DEFICIT HYPERACTIVITY DISORDER (ADHD), COMBINED TYPE: Primary | ICD-10-CM

## 2023-06-16 DIAGNOSIS — R73.03 PRE-DIABETES: ICD-10-CM

## 2023-06-16 RX ORDER — DEXTROAMPHETAMINE SACCHARATE, AMPHETAMINE ASPARTATE MONOHYDRATE, DEXTROAMPHETAMINE SULFATE AND AMPHETAMINE SULFATE 5; 5; 5; 5 MG/1; MG/1; MG/1; MG/1
20 CAPSULE, EXTENDED RELEASE ORAL EVERY MORNING
Qty: 30 CAPSULE | Refills: 0 | Status: SHIPPED | OUTPATIENT
Start: 2023-06-16

## 2023-06-16 NOTE — ASSESSMENT & PLAN NOTE
Psychological condition is worsening.  Discontinue 10 mg immediate release afternoon dose and will increase Adderall XR to 20 mg in the morning.  Psychological condition  will be reassessed in 3 months.

## 2023-06-16 NOTE — PROGRESS NOTES
Date: 2023   Patient Name: Nelson Rendon  : 1990   MRN: [id number]     Chief Complaint:    Chief Complaint   Patient presents with    ADHD     Discuss medication options        History of Present Illness: Nelson Rendon is a 33 y.o. female who is here today to follow up for ADHD.  She has stopped her 10 mg Adderall afternoon dose because she felt like it was too much of a stimulant effect on taking that and the Adderall XR.  Since she has stopped the 10 mg she does not feel like the 15 mg XR dose is helping as much as she would like.  When we previously increased to 20 mg XR she was still taking the afternoon immediate release dose.  She would like to try the increased dose of XR without the immediate release.           Review of Systems:   Review of Systems   Constitutional:  Negative for chills, fatigue and fever.   Respiratory:  Negative for cough and shortness of breath.    Cardiovascular:  Negative for chest pain and palpitations.   Gastrointestinal:  Negative for abdominal pain, constipation, diarrhea, nausea and vomiting.   Musculoskeletal:  Negative for back pain and myalgias.   Neurological:  Negative for dizziness and headache.   Psychiatric/Behavioral:  Positive for decreased concentration. Negative for depressed mood. The patient is not nervous/anxious.      Past Medical History:   Past Medical History:   Diagnosis Date    ADHD (attention deficit hyperactivity disorder)     GERD (gastroesophageal reflux disease)     Obesity        Past Surgical History:   Past Surgical History:   Procedure Laterality Date    CHOLECYSTECTOMY         Family History:   Family History   Problem Relation Age of Onset    Hypertension Father     Obesity Father     Diabetes Brother     Obesity Brother     Hypertension Brother     Diabetes Other     Obesity Other        Social History:   Social History     Socioeconomic History    Marital status: Single   Tobacco Use    Smoking status: Never    Smokeless tobacco:  "Never   Vaping Use    Vaping Use: Never used   Substance and Sexual Activity    Alcohol use: Yes     Alcohol/week: 3.0 standard drinks     Types: 3 Glasses of wine per week    Drug use: Never    Sexual activity: Defer       Medications:     Current Outpatient Medications:     omeprazole (priLOSEC) 40 MG capsule, TAKE ONE CAPSULE BY MOUTH DAILY BEFORE A MEAL, Disp: 90 capsule, Rfl: 1    amphetamine-dextroamphetamine XR (Adderall XR) 20 MG 24 hr capsule, Take 1 capsule by mouth Every Morning, Disp: 30 capsule, Rfl: 0    Allergies:   No Known Allergies    PHQ-2 Total Score:     PHQ-9 Total Score:       Physical Exam:  Vital Signs:   Vitals:    06/16/23 1108   BP: 122/86   BP Location: Left arm   Patient Position: Sitting   Cuff Size: Large Adult   Pulse: 94   Temp: 97.1 °F (36.2 °C)   TempSrc: Temporal   SpO2: 98%   Weight: (!) 188 kg (415 lb 4.8 oz)   Height: 165.1 cm (65\")     Body mass index is 69.11 kg/m².     Physical Exam  Vitals and nursing note reviewed.   Constitutional:       Appearance: Normal appearance.   Cardiovascular:      Rate and Rhythm: Normal rate and regular rhythm.      Heart sounds: Normal heart sounds. No murmur heard.  Pulmonary:      Effort: Pulmonary effort is normal.      Breath sounds: Normal breath sounds. No wheezing.   Neurological:      Mental Status: She is alert and oriented to person, place, and time. Mental status is at baseline.   Psychiatric:         Mood and Affect: Mood normal.         Behavior: Behavior normal.         Assessment/Plan:   Diagnoses and all orders for this visit:    1. Attention deficit hyperactivity disorder (ADHD), combined type (Primary)  Assessment & Plan:  Psychological condition is worsening.  Discontinue 10 mg immediate release afternoon dose and will increase Adderall XR to 20 mg in the morning.  Psychological condition  will be reassessed in 3 months.    Orders:  -     amphetamine-dextroamphetamine XR (Adderall XR) 20 MG 24 hr capsule; Take 1 capsule by " mouth Every Morning  Dispense: 30 capsule; Refill: 0    2. Pre-diabetes  -     Hemoglobin A1c; Future  -     CBC Auto Differential; Future  -     Comprehensive Metabolic Panel; Future  -     Hemoglobin A1c  -     CBC Auto Differential  -     Comprehensive Metabolic Panel           Follow Up:   Return in about 3 months (around 9/16/2023) for Med Recheck.    Mirna Martínez,   [facility]

## 2023-06-17 LAB
ALBUMIN SERPL-MCNC: 4.2 G/DL (ref 3.8–4.8)
ALBUMIN/GLOB SERPL: 1.3 {RATIO} (ref 1.2–2.2)
ALP SERPL-CCNC: 77 IU/L (ref 44–121)
ALT SERPL-CCNC: 11 IU/L (ref 0–32)
AST SERPL-CCNC: 18 IU/L (ref 0–40)
BASOPHILS # BLD AUTO: 0 X10E3/UL (ref 0–0.2)
BASOPHILS NFR BLD AUTO: 1 %
BILIRUB SERPL-MCNC: 0.3 MG/DL (ref 0–1.2)
BUN SERPL-MCNC: 10 MG/DL (ref 6–20)
BUN/CREAT SERPL: 11 (ref 9–23)
CALCIUM SERPL-MCNC: 9.9 MG/DL (ref 8.7–10.2)
CHLORIDE SERPL-SCNC: 100 MMOL/L (ref 96–106)
CO2 SERPL-SCNC: 22 MMOL/L (ref 20–29)
CREAT SERPL-MCNC: 0.93 MG/DL (ref 0.57–1)
EGFRCR SERPLBLD CKD-EPI 2021: 83 ML/MIN/1.73
EOSINOPHIL # BLD AUTO: 0.1 X10E3/UL (ref 0–0.4)
EOSINOPHIL NFR BLD AUTO: 1 %
ERYTHROCYTE [DISTWIDTH] IN BLOOD BY AUTOMATED COUNT: 13.3 % (ref 11.7–15.4)
GLOBULIN SER CALC-MCNC: 3.3 G/DL (ref 1.5–4.5)
GLUCOSE SERPL-MCNC: 103 MG/DL (ref 70–99)
HBA1C MFR BLD: 6 % (ref 4.8–5.6)
HCT VFR BLD AUTO: 41.6 % (ref 34–46.6)
HGB BLD-MCNC: 13.5 G/DL (ref 11.1–15.9)
IMM GRANULOCYTES # BLD AUTO: 0 X10E3/UL (ref 0–0.1)
IMM GRANULOCYTES NFR BLD AUTO: 0 %
LYMPHOCYTES # BLD AUTO: 3.3 X10E3/UL (ref 0.7–3.1)
LYMPHOCYTES NFR BLD AUTO: 42 %
MCH RBC QN AUTO: 25.5 PG (ref 26.6–33)
MCHC RBC AUTO-ENTMCNC: 32.5 G/DL (ref 31.5–35.7)
MCV RBC AUTO: 79 FL (ref 79–97)
MONOCYTES # BLD AUTO: 0.5 X10E3/UL (ref 0.1–0.9)
MONOCYTES NFR BLD AUTO: 7 %
NEUTROPHILS # BLD AUTO: 3.8 X10E3/UL (ref 1.4–7)
NEUTROPHILS NFR BLD AUTO: 49 %
PLATELET # BLD AUTO: 354 X10E3/UL (ref 150–450)
POTASSIUM SERPL-SCNC: 4.8 MMOL/L (ref 3.5–5.2)
PROT SERPL-MCNC: 7.5 G/DL (ref 6–8.5)
RBC # BLD AUTO: 5.3 X10E6/UL (ref 3.77–5.28)
SODIUM SERPL-SCNC: 138 MMOL/L (ref 134–144)
WBC # BLD AUTO: 7.8 X10E3/UL (ref 3.4–10.8)

## 2023-08-03 DIAGNOSIS — F90.2 ATTENTION DEFICIT HYPERACTIVITY DISORDER (ADHD), COMBINED TYPE: ICD-10-CM

## 2023-08-03 RX ORDER — DEXTROAMPHETAMINE SACCHARATE, AMPHETAMINE ASPARTATE MONOHYDRATE, DEXTROAMPHETAMINE SULFATE AND AMPHETAMINE SULFATE 5; 5; 5; 5 MG/1; MG/1; MG/1; MG/1
20 CAPSULE, EXTENDED RELEASE ORAL EVERY MORNING
Qty: 30 CAPSULE | Refills: 0 | Status: SHIPPED | OUTPATIENT
Start: 2023-08-03

## 2023-08-07 RX ORDER — OMEPRAZOLE 40 MG/1
CAPSULE, DELAYED RELEASE ORAL
Qty: 90 CAPSULE | Refills: 1 | Status: SHIPPED | OUTPATIENT
Start: 2023-08-07

## 2023-09-05 DIAGNOSIS — F90.2 ATTENTION DEFICIT HYPERACTIVITY DISORDER (ADHD), COMBINED TYPE: ICD-10-CM

## 2023-09-05 RX ORDER — DEXTROAMPHETAMINE SACCHARATE, AMPHETAMINE ASPARTATE MONOHYDRATE, DEXTROAMPHETAMINE SULFATE AND AMPHETAMINE SULFATE 5; 5; 5; 5 MG/1; MG/1; MG/1; MG/1
20 CAPSULE, EXTENDED RELEASE ORAL EVERY MORNING
Qty: 30 CAPSULE | Refills: 0 | Status: SHIPPED | OUTPATIENT
Start: 2023-09-05

## 2023-09-15 ENCOUNTER — TELEMEDICINE (OUTPATIENT)
Dept: FAMILY MEDICINE CLINIC | Facility: CLINIC | Age: 33
End: 2023-09-15
Payer: COMMERCIAL

## 2023-09-15 DIAGNOSIS — L65.9 HAIR LOSS: ICD-10-CM

## 2023-09-15 DIAGNOSIS — F90.2 ATTENTION DEFICIT HYPERACTIVITY DISORDER (ADHD), COMBINED TYPE: Primary | ICD-10-CM

## 2023-09-15 DIAGNOSIS — D50.9 IRON DEFICIENCY ANEMIA, UNSPECIFIED IRON DEFICIENCY ANEMIA TYPE: ICD-10-CM

## 2023-09-15 PROCEDURE — 99214 OFFICE O/P EST MOD 30 MIN: CPT | Performed by: FAMILY MEDICINE

## 2023-09-15 RX ORDER — OMEPRAZOLE 40 MG/1
40 CAPSULE, DELAYED RELEASE ORAL DAILY
COMMUNITY

## 2023-09-15 NOTE — PROGRESS NOTES
Patient was seen today through synchronous audio/video technology. Verbal consent was obtained. The patient was located at  work . Dr. Martínez was located at [facility] in [place], [place]. Vitals signs were not obtained due to lack of home monitoring access.          Date: 09/15/2023   Patient Name: Nelson Rendon  : 1990   MRN: [id number]     Chief Complaint:    Chief Complaint   Patient presents with    ADHD       History of Present Illness: Nelson Rendon is a 33 y.o. female who is here today to follow up for attention deficit disorder and refill of medications. she has not had any adverse effects from the medications. she specifically denies insomnia, weight loss, anorexia, agitation, anxiety, or palpitations.  Daily activities are improved on medications and attempts to wean medications or missed doses have been unsuccessful.    She also has history of chronic iron deficiency anemia but has been unable to tolerate p.o. medications.  She did just start a liquid supplement which is not causing significant side effects.  She has not had any labs to reassess recently.           Review of Systems:   Review of Systems   Constitutional:  Negative for chills, fatigue and fever.   Respiratory:  Negative for cough and shortness of breath.    Cardiovascular:  Negative for chest pain and palpitations.   Gastrointestinal:  Negative for abdominal pain, constipation, diarrhea, nausea and vomiting.   Musculoskeletal:  Negative for back pain and myalgias.   Neurological:  Negative for dizziness and headache.   Psychiatric/Behavioral:  Negative for decreased concentration, negative for hyperactivity and depressed mood. The patient is not nervous/anxious.      Past Medical History:   Past Medical History:   Diagnosis Date    ADHD (attention deficit hyperactivity disorder)     GERD (gastroesophageal reflux disease)     Obesity        Past Surgical History:   Past Surgical History:   Procedure Laterality Date     CHOLECYSTECTOMY         Family History:   Family History   Problem Relation Age of Onset    Hypertension Father     Obesity Father     Diabetes Brother     Obesity Brother     Hypertension Brother     Diabetes Other     Obesity Other        Social History:   Social History     Socioeconomic History    Marital status: Single   Tobacco Use    Smoking status: Never    Smokeless tobacco: Never   Vaping Use    Vaping Use: Never used   Substance and Sexual Activity    Alcohol use: Yes     Alcohol/week: 3.0 standard drinks     Types: 3 Glasses of wine per week    Drug use: Never    Sexual activity: Defer       Medications:     Current Outpatient Medications:     amphetamine-dextroamphetamine XR (Adderall XR) 20 MG 24 hr capsule, Take 1 capsule by mouth Every Morning, Disp: 30 capsule, Rfl: 0    omeprazole (priLOSEC) 40 MG capsule, Take 1 capsule by mouth Daily., Disp: , Rfl:     Allergies:   No Known Allergies    PHQ-2 Total Score:     PHQ-9 Total Score:       Physical Exam:  Vital Signs: There were no vitals filed for this visit.  There is no height or weight on file to calculate BMI.     Physical Exam  Vitals and nursing note reviewed.   Constitutional:       Appearance: Normal appearance.   HENT:      Head: Normocephalic.   Pulmonary:      Effort: Pulmonary effort is normal.   Neurological:      Mental Status: She is alert and oriented to person, place, and time.   Psychiatric:         Mood and Affect: Mood normal.         Behavior: Behavior normal.         Assessment/Plan:   Diagnoses and all orders for this visit:    1. Attention deficit hyperactivity disorder (ADHD), combined type (Primary)  Assessment & Plan:  Doing well on current dose. Unable to wean medications. Discussed behavioral modifications and possibilty of medication vacations. Prescription sent to pharmacy. Will recheck in 3 months.         2. Iron deficiency anemia, unspecified iron deficiency anemia type  Assessment & Plan:  Patient will return for  labs for further evaluation    Orders:  -     Vitamin B12; Future  -     Iron Profile; Future  -     Reticulocytes; Future  -     Folate; Future    3. Hair loss  -     CBC Auto Differential; Future  -     Comprehensive Metabolic Panel; Future  -     TSH; Future  -     T4, Free; Future           Follow Up:   Return in about 3 months (around 12/15/2023).    Mirna Martínez, DO  [facility]

## 2023-10-03 ENCOUNTER — TELEMEDICINE (OUTPATIENT)
Dept: FAMILY MEDICINE CLINIC | Facility: CLINIC | Age: 33
End: 2023-10-03
Payer: COMMERCIAL

## 2023-10-03 VITALS — WEIGHT: 293 LBS | BODY MASS INDEX: 47.09 KG/M2 | HEIGHT: 66 IN

## 2023-10-03 DIAGNOSIS — U07.1 COVID-19 VIRUS INFECTION: Primary | ICD-10-CM

## 2023-10-03 PROCEDURE — 99213 OFFICE O/P EST LOW 20 MIN: CPT | Performed by: PHYSICIAN ASSISTANT

## 2023-10-03 RX ORDER — FLUTICASONE PROPIONATE 50 MCG
2 SPRAY, SUSPENSION (ML) NASAL DAILY
Qty: 9.9 ML | Refills: 0 | Status: SHIPPED | OUTPATIENT
Start: 2023-10-03

## 2023-10-03 RX ORDER — ALBUTEROL SULFATE 90 UG/1
2 AEROSOL, METERED RESPIRATORY (INHALATION) EVERY 4 HOURS PRN
Qty: 3 G | Refills: 1 | Status: SHIPPED | OUTPATIENT
Start: 2023-10-03

## 2023-10-03 NOTE — ASSESSMENT & PLAN NOTE
I advised close monitoring of symptoms and symptomatic treatment.  I recommend Mucinex DM for her cough and congestion, Flonase for her nasal congestion, and nasal saline for the irritation.  I also recommend that she go to the hospital if she develops shortness of breath, and she is in agreement.

## 2023-10-03 NOTE — LETTER
October 3, 2023     Patient: Nelson Rendon   YOB: 1990   Date of Visit: 10/3/2023       To Whom It May Concern:    It is my medical opinion that Nelson Rendon may return to work/school on 10/9/23. Please excuse her absence 10/2/23-10/8/23.         Sincerely,        Chelsey Holt PA-C    CC: No Recipients

## 2023-10-03 NOTE — PROGRESS NOTES
Office Note     Name: Nelson Rendon    : 1990     MRN: [id number]     Chief Complaint  URI, Cough, and Covid-19 Home Monitoring Video Visit    Subjective     Patient was seen today through synchronous audio technology. Verbal consent was obtained. The patient was located at home. Chelsey Holt PA-C was located at [facility] in [place], [place]. Vitals signs were not obtained due to lack of home monitoring access. Time spent with patient was 20 minutes.     History of Present Illness:  Nelson Rendon is a 33 y.o. female who presents today for eval of cough, congestion, and fever up to 102 F x 2 days.  She states that she tested positive for COVID-19 yesterday.  Both of her parents also have it.  She has had COVID in the past, but it has been 2 years.  She admits some mild shortness of breath with exertion as well as sleep disturbance.  She has used Afrin nasal spray and Tylenol for her symptoms.    Review of Systems:   Review of Systems   Constitutional:  Positive for chills, fatigue and fever (102 F). Negative for appetite change.   HENT:  Positive for congestion and sore throat. Negative for ear pain, rhinorrhea, sinus pressure and sneezing.    Respiratory:  Positive for cough and shortness of breath. Negative for chest tightness and wheezing.    Gastrointestinal:  Positive for diarrhea (mild) and nausea. Negative for vomiting.   Musculoskeletal:  Positive for myalgias.   Neurological:  Positive for headache.   Psychiatric/Behavioral:  Positive for sleep disturbance.      Past Medical History:   Past Medical History:   Diagnosis Date    ADHD (attention deficit hyperactivity disorder)     GERD (gastroesophageal reflux disease)     Obesity        Past Surgical History:   Past Surgical History:   Procedure Laterality Date    CHOLECYSTECTOMY         Family History:   Family History   Problem Relation Age of Onset    Hypertension Father     Obesity Father     Diabetes Brother     Obesity  "Brother     Hypertension Brother     Diabetes Other     Obesity Other        Social History:   Social History     Socioeconomic History    Marital status: Single   Tobacco Use    Smoking status: Never    Smokeless tobacco: Never   Vaping Use    Vaping Use: Never used   Substance and Sexual Activity    Alcohol use: Yes     Alcohol/week: 3.0 standard drinks     Types: 3 Glasses of wine per week    Drug use: Never    Sexual activity: Defer       Immunizations:   Immunization History   Administered Date(s) Administered    COVID-19 (MODERNA) 1st,2nd,3rd Dose Monovalent 08/27/2021, 09/27/2021    COVID-19 (MODERNA) Monovalent Original Booster 08/27/2021, 09/27/2021    DTP / HiB 01/10/1996    Hep B, Adolescent or Pediatric 11/06/2000, 12/07/2000, 05/01/2001    MMR 11/06/2000, 05/01/2001    OPV 01/10/1996    Tdap 03/08/2006, 07/07/2023        Medications:     Current Outpatient Medications:     amphetamine-dextroamphetamine XR (Adderall XR) 20 MG 24 hr capsule, Take 1 capsule by mouth Every Morning, Disp: 30 capsule, Rfl: 0    omeprazole (priLOSEC) 40 MG capsule, Take 1 capsule by mouth Daily., Disp: , Rfl:     albuterol sulfate  (90 Base) MCG/ACT inhaler, Inhale 2 puffs Every 4 (Four) Hours As Needed for Wheezing., Disp: 3 g, Rfl: 1    fluticasone (FLONASE) 50 MCG/ACT nasal spray, 2 sprays into the nostril(s) as directed by provider Daily., Disp: 9.9 mL, Rfl: 0    Nirmatrelvir&Ritonavir 300/100 (PAXLOVID) 20 x 150 MG & 10 x 100MG tablet therapy pack tablet, Take 3 tablets by mouth 2 (Two) Times a Day., Disp: 30 tablet, Rfl: 0    Allergies:   No Known Allergies    Objective     Vital Signs  Ht 167.6 cm (66\")   Wt (!) 181 kg (400 lb)   BMI 64.56 kg/m²   Estimated body mass index is 64.56 kg/m² as calculated from the following:    Height as of this encounter: 167.6 cm (66\").    Weight as of this encounter: 181 kg (400 lb).    Physical Exam  Pulmonary:      Effort: Pulmonary effort is normal. No respiratory distress. "   Neurological:      General: No focal deficit present.      Mental Status: She is oriented to person, place, and time.   Psychiatric:         Mood and Affect: Mood normal.         Behavior: Behavior normal.         Thought Content: Thought content normal.         Judgment: Judgment normal.        Assessment and Plan     Assessment/Plan:  Diagnoses and all orders for this visit:    1. COVID-19 virus infection (Primary)  Assessment & Plan:  I advised close monitoring of symptoms and symptomatic treatment.  I recommend Mucinex DM for her cough and congestion, Flonase for her nasal congestion, and nasal saline for the irritation.  I also recommend that she go to the hospital if she develops shortness of breath, and she is in agreement.    Orders:  -     Nirmatrelvir&Ritonavir 300/100 (PAXLOVID) 20 x 150 MG & 10 x 100MG tablet therapy pack tablet; Take 3 tablets by mouth 2 (Two) Times a Day.  Dispense: 30 tablet; Refill: 0  -     fluticasone (FLONASE) 50 MCG/ACT nasal spray; 2 sprays into the nostril(s) as directed by provider Daily.  Dispense: 9.9 mL; Refill: 0  -     albuterol sulfate  (90 Base) MCG/ACT inhaler; Inhale 2 puffs Every 4 (Four) Hours As Needed for Wheezing.  Dispense: 3 g; Refill: 1        Follow Up  Return if symptoms worsen or fail to improve.    Chelsey Holt PA-C  [facility]

## 2023-10-04 DIAGNOSIS — F90.2 ATTENTION DEFICIT HYPERACTIVITY DISORDER (ADHD), COMBINED TYPE: ICD-10-CM

## 2023-10-04 RX ORDER — DEXTROAMPHETAMINE SACCHARATE, AMPHETAMINE ASPARTATE MONOHYDRATE, DEXTROAMPHETAMINE SULFATE AND AMPHETAMINE SULFATE 5; 5; 5; 5 MG/1; MG/1; MG/1; MG/1
20 CAPSULE, EXTENDED RELEASE ORAL EVERY MORNING
Qty: 30 CAPSULE | Refills: 0 | Status: SHIPPED | OUTPATIENT
Start: 2023-10-04

## 2023-10-04 NOTE — TELEPHONE ENCOUNTER
Caller: Nelson Rendon    Relationship: Self    Best call back number: [phone]     Requested Prescriptions:   Requested Prescriptions     Pending Prescriptions Disp Refills    amphetamine-dextroamphetamine XR (Adderall XR) 20 MG 24 hr capsule 30 capsule 0     Sig: Take 1 capsule by mouth Every Morning        Pharmacy where request should be sent: [id number] - [place], [place] - [address] [place] [address] [facility] [address] [facility], [address] [facility] [address]  & [address] [facility] [phone] [facility] [phone] FX     Last office visit with prescribing clinician: 2023   Last telemedicine visit with prescribing clinician: 10/3/2023   Next office visit with prescribing clinician: Visit date not found     Additional details provided by patient: PATIENT'S PHARMACY IS NEEDS A NEW PRESCRIPTION FOR THIS MEDICATION. THE LAST TIME THEY FILLED THE PRESCRIPTION THEY USED THE NEW PRESCRIPTION INSTEAD OF THE OLD ONE AND NOW THE OLD ONE IS .    Does the patient have less than a 3 day supply:  [x] Yes  [] No    Would you like a call back once the refill request has been completed: [] Yes [x] No    If the office needs to give you a call back, can they leave a voicemail: [] Yes [x] No    Rhonda Talley Rep   10/04/23 10:50 EDT

## 2023-10-30 NOTE — ASSESSMENT & PLAN NOTE
Patient's (Body mass index is 68.31 kg/m².) indicates that they are morbidly/severely obese (BMI > 40 or > 35 with obesity - related health condition) with health conditions that include none . Weight is unchanged. BMI  is above average; BMI management plan is completed. We discussed portion control and increasing exercise.    No

## 2023-11-05 DIAGNOSIS — F90.2 ATTENTION DEFICIT HYPERACTIVITY DISORDER (ADHD), COMBINED TYPE: ICD-10-CM

## 2023-11-06 RX ORDER — DEXTROAMPHETAMINE SACCHARATE, AMPHETAMINE ASPARTATE MONOHYDRATE, DEXTROAMPHETAMINE SULFATE AND AMPHETAMINE SULFATE 5; 5; 5; 5 MG/1; MG/1; MG/1; MG/1
20 CAPSULE, EXTENDED RELEASE ORAL EVERY MORNING
Qty: 30 CAPSULE | Refills: 0 | Status: SHIPPED | OUTPATIENT
Start: 2023-11-06

## 2023-12-04 DIAGNOSIS — F90.2 ATTENTION DEFICIT HYPERACTIVITY DISORDER (ADHD), COMBINED TYPE: ICD-10-CM

## 2023-12-06 RX ORDER — DEXTROAMPHETAMINE SACCHARATE, AMPHETAMINE ASPARTATE MONOHYDRATE, DEXTROAMPHETAMINE SULFATE AND AMPHETAMINE SULFATE 5; 5; 5; 5 MG/1; MG/1; MG/1; MG/1
20 CAPSULE, EXTENDED RELEASE ORAL EVERY MORNING
Qty: 30 CAPSULE | Refills: 0 | Status: SHIPPED | OUTPATIENT
Start: 2023-12-06

## 2023-12-08 ENCOUNTER — LAB (OUTPATIENT)
Dept: FAMILY MEDICINE CLINIC | Facility: CLINIC | Age: 33
End: 2023-12-08
Payer: COMMERCIAL

## 2023-12-08 DIAGNOSIS — Z11.3 ROUTINE SCREENING FOR STI (SEXUALLY TRANSMITTED INFECTION): Primary | ICD-10-CM

## 2023-12-08 DIAGNOSIS — L65.9 HAIR LOSS: ICD-10-CM

## 2023-12-08 DIAGNOSIS — D50.9 IRON DEFICIENCY ANEMIA, UNSPECIFIED IRON DEFICIENCY ANEMIA TYPE: ICD-10-CM

## 2023-12-08 PROCEDURE — 36415 COLL VENOUS BLD VENIPUNCTURE: CPT | Performed by: FAMILY MEDICINE

## 2023-12-09 LAB
ALBUMIN SERPL-MCNC: 4 G/DL (ref 3.9–4.9)
ALBUMIN/GLOB SERPL: 1.2 {RATIO} (ref 1.2–2.2)
ALP SERPL-CCNC: 70 IU/L (ref 44–121)
ALT SERPL-CCNC: 9 IU/L (ref 0–32)
AST SERPL-CCNC: 17 IU/L (ref 0–40)
BASOPHILS # BLD AUTO: 0.1 X10E3/UL (ref 0–0.2)
BASOPHILS NFR BLD AUTO: 1 %
BILIRUB SERPL-MCNC: 0.2 MG/DL (ref 0–1.2)
BUN SERPL-MCNC: 10 MG/DL (ref 6–20)
BUN/CREAT SERPL: 10 (ref 9–23)
CALCIUM SERPL-MCNC: 9.4 MG/DL (ref 8.7–10.2)
CHLORIDE SERPL-SCNC: 100 MMOL/L (ref 96–106)
CO2 SERPL-SCNC: 22 MMOL/L (ref 20–29)
CREAT SERPL-MCNC: 0.97 MG/DL (ref 0.57–1)
EGFRCR SERPLBLD CKD-EPI 2021: 79 ML/MIN/1.73
EOSINOPHIL # BLD AUTO: 0.1 X10E3/UL (ref 0–0.4)
EOSINOPHIL NFR BLD AUTO: 1 %
ERYTHROCYTE [DISTWIDTH] IN BLOOD BY AUTOMATED COUNT: 13.4 % (ref 11.7–15.4)
FOLATE SERPL-MCNC: 3.3 NG/ML
GLOBULIN SER CALC-MCNC: 3.3 G/DL (ref 1.5–4.5)
GLUCOSE SERPL-MCNC: 94 MG/DL (ref 70–99)
HCT VFR BLD AUTO: 38.9 % (ref 34–46.6)
HGB BLD-MCNC: 12.8 G/DL (ref 11.1–15.9)
IMM GRANULOCYTES # BLD AUTO: 0 X10E3/UL (ref 0–0.1)
IMM GRANULOCYTES NFR BLD AUTO: 0 %
IRON SATN MFR SERPL: 14 % (ref 15–55)
IRON SERPL-MCNC: 44 UG/DL (ref 27–159)
LYMPHOCYTES # BLD AUTO: 2.9 X10E3/UL (ref 0.7–3.1)
LYMPHOCYTES NFR BLD AUTO: 38 %
MCH RBC QN AUTO: 25.7 PG (ref 26.6–33)
MCHC RBC AUTO-ENTMCNC: 32.9 G/DL (ref 31.5–35.7)
MCV RBC AUTO: 78 FL (ref 79–97)
MONOCYTES # BLD AUTO: 0.5 X10E3/UL (ref 0.1–0.9)
MONOCYTES NFR BLD AUTO: 7 %
NEUTROPHILS # BLD AUTO: 4 X10E3/UL (ref 1.4–7)
NEUTROPHILS NFR BLD AUTO: 53 %
PLATELET # BLD AUTO: 359 X10E3/UL (ref 150–450)
POTASSIUM SERPL-SCNC: 4.6 MMOL/L (ref 3.5–5.2)
PROT SERPL-MCNC: 7.3 G/DL (ref 6–8.5)
RBC # BLD AUTO: 4.98 X10E6/UL (ref 3.77–5.28)
RETICS/RBC NFR AUTO: 1.4 % (ref 0.6–2.6)
SODIUM SERPL-SCNC: 138 MMOL/L (ref 134–144)
T4 FREE SERPL-MCNC: 0.95 NG/DL (ref 0.82–1.77)
TIBC SERPL-MCNC: 305 UG/DL (ref 250–450)
TSH SERPL DL<=0.005 MIU/L-ACNC: 2.57 UIU/ML (ref 0.45–4.5)
UIBC SERPL-MCNC: 261 UG/DL (ref 131–425)
VIT B12 SERPL-MCNC: 661 PG/ML (ref 232–1245)
WBC # BLD AUTO: 7.6 X10E3/UL (ref 3.4–10.8)

## 2024-01-05 DIAGNOSIS — F90.2 ATTENTION DEFICIT HYPERACTIVITY DISORDER (ADHD), COMBINED TYPE: ICD-10-CM

## 2024-01-08 RX ORDER — DEXTROAMPHETAMINE SACCHARATE, AMPHETAMINE ASPARTATE MONOHYDRATE, DEXTROAMPHETAMINE SULFATE AND AMPHETAMINE SULFATE 5; 5; 5; 5 MG/1; MG/1; MG/1; MG/1
20 CAPSULE, EXTENDED RELEASE ORAL EVERY MORNING
Qty: 30 CAPSULE | Refills: 0 | Status: SHIPPED | OUTPATIENT
Start: 2024-01-08

## 2024-02-13 DIAGNOSIS — F90.2 ATTENTION DEFICIT HYPERACTIVITY DISORDER (ADHD), COMBINED TYPE: ICD-10-CM

## 2024-02-13 RX ORDER — DEXTROAMPHETAMINE SACCHARATE, AMPHETAMINE ASPARTATE MONOHYDRATE, DEXTROAMPHETAMINE SULFATE AND AMPHETAMINE SULFATE 5; 5; 5; 5 MG/1; MG/1; MG/1; MG/1
20 CAPSULE, EXTENDED RELEASE ORAL EVERY MORNING
Qty: 30 CAPSULE | Refills: 0 | Status: SHIPPED | OUTPATIENT
Start: 2024-02-13

## 2024-03-25 DIAGNOSIS — F90.2 ATTENTION DEFICIT HYPERACTIVITY DISORDER (ADHD), COMBINED TYPE: ICD-10-CM

## 2024-03-25 RX ORDER — DEXTROAMPHETAMINE SACCHARATE, AMPHETAMINE ASPARTATE MONOHYDRATE, DEXTROAMPHETAMINE SULFATE AND AMPHETAMINE SULFATE 5; 5; 5; 5 MG/1; MG/1; MG/1; MG/1
20 CAPSULE, EXTENDED RELEASE ORAL EVERY MORNING
Qty: 30 CAPSULE | Refills: 0 | Status: SHIPPED | OUTPATIENT
Start: 2024-03-25

## 2024-03-25 NOTE — TELEPHONE ENCOUNTER
Caller: Nelson Rendon    Relationship: Self    Best call back number:  [phone]     Requested Prescriptions:   Requested Prescriptions     Pending Prescriptions Disp Refills    amphetamine-dextroamphetamine XR (Adderall XR) 20 MG 24 hr capsule 30 capsule 0     Sig: Take 1 capsule by mouth Every Morning        Pharmacy where request should be sent: [id number] - [place], [place] - [address]  [address] [facility] [phone] [facility] [phone] FX     Last office visit with prescribing clinician: 6/16/2023   Last telemedicine visit with prescribing clinician: 10/3/2023   Next office visit with prescribing clinician: 4/12/2024     Additional details provided by patient: HAS TWO PILLS LEFT.    Does the patient have less than a 3 day supply:  [x] Yes  [] No    Would you like a call back once the refill request has been completed: [] Yes [x] No    If the office needs to give you a call back, can they leave a voicemail: [] Yes [x] No    Rhonda Ward   03/25/24 12:39 EDT

## 2024-03-25 NOTE — TELEPHONE ENCOUNTER
PT is having constipation from iron medication wants to know what she can do or if infusion would be better . If we could call and let the patient know.

## 2024-03-29 DIAGNOSIS — F90.2 ATTENTION DEFICIT HYPERACTIVITY DISORDER (ADHD), COMBINED TYPE: ICD-10-CM

## 2024-03-29 NOTE — TELEPHONE ENCOUNTER
Caller: Nelson Rendon    Relationship: Self    Best call back number: [phone]     Requested Prescriptions:   Requested Prescriptions     Pending Prescriptions Disp Refills    amphetamine-dextroamphetamine XR (Adderall XR) 20 MG 24 hr capsule 30 capsule 0     Sig: Take 1 capsule by mouth Every Morning        Pharmacy where request should be sent: [id number] [place] [address] AT [facility] [address] [facility] [address] [facility] [phone] [facility] [phone] FX     Last office visit with prescribing clinician: 6/16/2023   Last telemedicine visit with prescribing clinician: 10/3/2023   Next office visit with prescribing clinician: 4/12/2024     Additional details provided by patient: PT IS OUT OF RX. [facility] IN [place] IS CURRENTLY OUT OF THE MEDICATION. THE [facility] IN [place] DOES HAVE IT IN STOCK AND WOULD LIKE IT SENT THERE.    Does the patient have less than a 3 day supply:  [x] Yes  [] No    Would you like a call back once the refill request has been completed: [] Yes [] No    If the office needs to give you a call back, can they leave a voicemail: [] Yes [x] No    Rhonda Haney Rep   03/29/24 15:26 EDT

## 2024-03-30 DIAGNOSIS — F90.2 ATTENTION DEFICIT HYPERACTIVITY DISORDER (ADHD), COMBINED TYPE: ICD-10-CM

## 2024-03-31 RX ORDER — DEXTROAMPHETAMINE SACCHARATE, AMPHETAMINE ASPARTATE MONOHYDRATE, DEXTROAMPHETAMINE SULFATE AND AMPHETAMINE SULFATE 5; 5; 5; 5 MG/1; MG/1; MG/1; MG/1
20 CAPSULE, EXTENDED RELEASE ORAL EVERY MORNING
Qty: 30 CAPSULE | Refills: 0 | OUTPATIENT
Start: 2024-03-31

## 2024-04-02 RX ORDER — DEXTROAMPHETAMINE SACCHARATE, AMPHETAMINE ASPARTATE MONOHYDRATE, DEXTROAMPHETAMINE SULFATE AND AMPHETAMINE SULFATE 5; 5; 5; 5 MG/1; MG/1; MG/1; MG/1
20 CAPSULE, EXTENDED RELEASE ORAL EVERY MORNING
Qty: 30 CAPSULE | Refills: 0 | Status: SHIPPED | OUTPATIENT
Start: 2024-04-02

## 2024-04-12 ENCOUNTER — TELEMEDICINE (OUTPATIENT)
Dept: FAMILY MEDICINE CLINIC | Facility: CLINIC | Age: 34
End: 2024-04-12
Payer: COMMERCIAL

## 2024-04-12 VITALS — WEIGHT: 293 LBS | BODY MASS INDEX: 47.09 KG/M2 | HEIGHT: 66 IN

## 2024-04-12 DIAGNOSIS — D50.9 IRON DEFICIENCY ANEMIA, UNSPECIFIED IRON DEFICIENCY ANEMIA TYPE: Primary | ICD-10-CM

## 2024-04-12 DIAGNOSIS — E66.01 MORBID OBESITY: ICD-10-CM

## 2024-04-12 NOTE — PROGRESS NOTES
Patient was seen today through synchronous audio/video technology. Verbal consent was obtained. The patient was located at home. Dr. Martínez was located at [facility] in [place], [place]. Vitals signs were not obtained due to lack of home monitoring access.    Date: 2024   Patient Name: Nelson Rendon  : 1990   MRN: [id number]     Chief Complaint:    Chief Complaint   Patient presents with    Follow-up     Weight loss    ADD     Follow up       History of Present Illness: Nelson Rendon is a 33 y.o. female who is here today to follow up for attention deficit disorder and refill of medications. she has not had any adverse effects from the medications. she specifically denies insomnia, weight loss, anorexia, agitation, anxiety, or palpitations.  Daily activities are improved on medications and attempts to wean medications or missed doses have been unsuccessful. She would like to discuss increasing the dose.       Struggling with weight loss and is interested in GLP-1 meds,          Review of Systems:   Review of Systems   Constitutional:  Positive for unexpected weight gain. Negative for chills, fatigue and fever.   Respiratory:  Negative for cough and shortness of breath.    Cardiovascular:  Negative for chest pain and palpitations.   Gastrointestinal:  Negative for abdominal pain, constipation, diarrhea, nausea and vomiting.   Musculoskeletal:  Negative for back pain and myalgias.   Neurological:  Negative for dizziness and headache.   Psychiatric/Behavioral:  Positive for decreased concentration. Negative for depressed mood. The patient is not nervous/anxious.        Past Medical History:   Past Medical History:   Diagnosis Date    ADHD (attention deficit hyperactivity disorder)     GERD (gastroesophageal reflux disease)     Obesity        Past Surgical History:   Past Surgical History:   Procedure Laterality Date    CHOLECYSTECTOMY         Family History:   Family History   Problem  "Relation Age of Onset    Hypertension Father     Obesity Father     Diabetes Brother     Obesity Brother     Hypertension Brother     Diabetes Other     Obesity Other        Social History:   Social History     Socioeconomic History    Marital status: Single   Tobacco Use    Smoking status: Never    Smokeless tobacco: Never   Vaping Use    Vaping status: Never Used   Substance and Sexual Activity    Alcohol use: Yes     Alcohol/week: 3.0 standard drinks of alcohol     Types: 3 Glasses of wine per week    Drug use: Never    Sexual activity: Defer       Medications:     Current Outpatient Medications:     amphetamine-dextroamphetamine XR (Adderall XR) 20 MG 24 hr capsule, Take 1 capsule by mouth Every Morning, Disp: 30 capsule, Rfl: 0    omeprazole (priLOSEC) 40 MG capsule, Take 1 capsule by mouth Daily., Disp: , Rfl:     Tirzepatide-Weight Management (ZEPBOUND) 2.5 MG/0.5ML solution auto-injector, Inject 0.5 mL under the skin into the appropriate area as directed 1 (One) Time Per Week., Disp: 2 mL, Rfl: 0    Tirzepatide-Weight Management (ZEPBOUND) 5 MG/0.5ML solution auto-injector, Inject 0.5 mL under the skin into the appropriate area as directed 1 (One) Time Per Week., Disp: 2 mL, Rfl: 0    Tirzepatide-Weight Management (ZEPBOUND) 7.5 MG/0.5ML solution auto-injector, Inject 0.5 mL under the skin into the appropriate area as directed 1 (One) Time Per Week., Disp: 2 mL, Rfl: 1    Allergies:   No Known Allergies    PHQ-2 Total Score: 0   PHQ-9 Total Score: 0     Physical Exam:  Vital Signs:   Vitals:    04/12/24 0827   Weight: (!) 181 kg (400 lb)   Height: 167.6 cm (65.98\")     Body mass index is 64.59 kg/m².     Physical Exam  Vitals and nursing note reviewed.   Constitutional:       Appearance: Normal appearance.   HENT:      Head: Normocephalic.   Pulmonary:      Effort: Pulmonary effort is normal.   Neurological:      Mental Status: She is alert and oriented to person, place, and time.   Psychiatric:         Mood " and Affect: Mood normal.         Behavior: Behavior normal.           Assessment/Plan:   Diagnoses and all orders for this visit:    1. Iron deficiency anemia, unspecified iron deficiency anemia type (Primary)  -     CBC Auto Differential; Future  -     Basic Metabolic Panel; Future  -     Iron Profile; Future  -     Reticulocytes; Future    2. Morbid obesity  Assessment & Plan:  Patient's (Body mass index is 64.59 kg/m².) indicates that they are morbidly/severely obese (BMI > 40 or > 35 with obesity - related health condition) with health conditions that include none . Weight is unchanged. BMI  is above average; BMI management plan is completed. We discussed portion control and increasing exercise. Rx Zepbound, pt shown how to use the pen and we discussing dosing, SE, and storage. F/u in 3 months.     Orders:  -     Tirzepatide-Weight Management (ZEPBOUND) 2.5 MG/0.5ML solution auto-injector; Inject 0.5 mL under the skin into the appropriate area as directed 1 (One) Time Per Week.  Dispense: 2 mL; Refill: 0  -     Tirzepatide-Weight Management (ZEPBOUND) 5 MG/0.5ML solution auto-injector; Inject 0.5 mL under the skin into the appropriate area as directed 1 (One) Time Per Week.  Dispense: 2 mL; Refill: 0  -     Tirzepatide-Weight Management (ZEPBOUND) 7.5 MG/0.5ML solution auto-injector; Inject 0.5 mL under the skin into the appropriate area as directed 1 (One) Time Per Week.  Dispense: 2 mL; Refill: 1           Follow Up:   Return in about 3 months (around 7/12/2024) for Med Recheck.    Mirna Martínez DO  [facility]

## 2024-04-18 ENCOUNTER — TELEPHONE (OUTPATIENT)
Dept: FAMILY MEDICINE CLINIC | Facility: CLINIC | Age: 34
End: 2024-04-18
Payer: COMMERCIAL

## 2024-04-29 DIAGNOSIS — F90.2 ATTENTION DEFICIT HYPERACTIVITY DISORDER (ADHD), COMBINED TYPE: ICD-10-CM

## 2024-04-29 NOTE — ASSESSMENT & PLAN NOTE
Patient's (Body mass index is 64.59 kg/m².) indicates that they are morbidly/severely obese (BMI > 40 or > 35 with obesity - related health condition) with health conditions that include none . Weight is unchanged. BMI  is above average; BMI management plan is completed. We discussed portion control and increasing exercise. Rx Zepbound, pt shown how to use the pen and we discussing dosing, SE, and storage. F/u in 3 months.

## 2024-04-30 RX ORDER — DEXTROAMPHETAMINE SACCHARATE, AMPHETAMINE ASPARTATE MONOHYDRATE, DEXTROAMPHETAMINE SULFATE AND AMPHETAMINE SULFATE 5; 5; 5; 5 MG/1; MG/1; MG/1; MG/1
20 CAPSULE, EXTENDED RELEASE ORAL EVERY MORNING
Qty: 30 CAPSULE | Refills: 0 | Status: SHIPPED | OUTPATIENT
Start: 2024-04-30

## 2024-05-29 DIAGNOSIS — F90.2 ATTENTION DEFICIT HYPERACTIVITY DISORDER (ADHD), COMBINED TYPE: ICD-10-CM

## 2024-05-29 RX ORDER — DEXTROAMPHETAMINE SACCHARATE, AMPHETAMINE ASPARTATE MONOHYDRATE, DEXTROAMPHETAMINE SULFATE AND AMPHETAMINE SULFATE 5; 5; 5; 5 MG/1; MG/1; MG/1; MG/1
20 CAPSULE, EXTENDED RELEASE ORAL EVERY MORNING
Qty: 30 CAPSULE | Refills: 0 | Status: SHIPPED | OUTPATIENT
Start: 2024-05-29

## 2024-07-02 DIAGNOSIS — F90.2 ATTENTION DEFICIT HYPERACTIVITY DISORDER (ADHD), COMBINED TYPE: ICD-10-CM

## 2024-07-02 RX ORDER — DEXTROAMPHETAMINE SACCHARATE, AMPHETAMINE ASPARTATE MONOHYDRATE, DEXTROAMPHETAMINE SULFATE AND AMPHETAMINE SULFATE 5; 5; 5; 5 MG/1; MG/1; MG/1; MG/1
20 CAPSULE, EXTENDED RELEASE ORAL EVERY MORNING
Qty: 30 CAPSULE | Refills: 0 | Status: SHIPPED | OUTPATIENT
Start: 2024-07-02

## 2024-08-02 DIAGNOSIS — F90.2 ATTENTION DEFICIT HYPERACTIVITY DISORDER (ADHD), COMBINED TYPE: ICD-10-CM

## 2024-08-02 RX ORDER — DEXTROAMPHETAMINE SACCHARATE, AMPHETAMINE ASPARTATE MONOHYDRATE, DEXTROAMPHETAMINE SULFATE AND AMPHETAMINE SULFATE 5; 5; 5; 5 MG/1; MG/1; MG/1; MG/1
20 CAPSULE, EXTENDED RELEASE ORAL EVERY MORNING
Qty: 30 CAPSULE | Refills: 0 | OUTPATIENT
Start: 2024-08-02

## 2024-08-02 RX ORDER — DEXTROAMPHETAMINE SACCHARATE, AMPHETAMINE ASPARTATE MONOHYDRATE, DEXTROAMPHETAMINE SULFATE AND AMPHETAMINE SULFATE 5; 5; 5; 5 MG/1; MG/1; MG/1; MG/1
20 CAPSULE, EXTENDED RELEASE ORAL EVERY MORNING
Qty: 30 CAPSULE | Refills: 0 | Status: SHIPPED | OUTPATIENT
Start: 2024-08-02 | End: 2024-08-02 | Stop reason: SDUPTHER

## 2024-08-02 RX ORDER — DEXTROAMPHETAMINE SACCHARATE, AMPHETAMINE ASPARTATE MONOHYDRATE, DEXTROAMPHETAMINE SULFATE AND AMPHETAMINE SULFATE 5; 5; 5; 5 MG/1; MG/1; MG/1; MG/1
20 CAPSULE, EXTENDED RELEASE ORAL EVERY MORNING
Qty: 30 CAPSULE | Refills: 0 | Status: SHIPPED | OUTPATIENT
Start: 2024-08-02

## 2024-08-02 NOTE — TELEPHONE ENCOUNTER
Telehealth scheduled for 8/15, pt will come in office prior to appointment for labs and a uds, can we refill meds until appointment date?

## 2024-08-21 ENCOUNTER — LAB (OUTPATIENT)
Dept: FAMILY MEDICINE CLINIC | Facility: CLINIC | Age: 34
End: 2024-08-21
Payer: COMMERCIAL

## 2024-08-21 DIAGNOSIS — Z11.3 ROUTINE SCREENING FOR STI (SEXUALLY TRANSMITTED INFECTION): ICD-10-CM

## 2024-08-21 DIAGNOSIS — Z51.81 MEDICATION MONITORING ENCOUNTER: Primary | ICD-10-CM

## 2024-08-21 DIAGNOSIS — D50.9 IRON DEFICIENCY ANEMIA, UNSPECIFIED IRON DEFICIENCY ANEMIA TYPE: ICD-10-CM

## 2024-08-21 LAB
AMPHET+METHAMPHET UR QL: POSITIVE
AMPHETAMINE INTERNAL CONTROL: ABNORMAL
AMPHETAMINES UR QL: NEGATIVE
BARBITURATE INTERNAL CONTROL: ABNORMAL
BARBITURATES UR QL SCN: NEGATIVE
BENZODIAZ UR QL SCN: NEGATIVE
BENZODIAZEPINE INTERNAL CONTROL: ABNORMAL
BUPRENORPHINE INTERNAL CONTROL: ABNORMAL
BUPRENORPHINE SERPL-MCNC: NEGATIVE NG/ML
CANNABINOIDS SERPL QL: NEGATIVE
COCAINE INTERNAL CONTROL: ABNORMAL
COCAINE UR QL: NEGATIVE
EXPIRATION DATE: ABNORMAL
Lab: ABNORMAL
MDMA (ECSTASY) INTERNAL CONTROL: ABNORMAL
MDMA UR QL SCN: NEGATIVE
METHADONE INTERNAL CONTROL: ABNORMAL
METHADONE UR QL SCN: NEGATIVE
METHAMPHETAMINE INTERNAL CONTROL: ABNORMAL
MORPHINE INTERNAL CONTROL: ABNORMAL
MORPHINE/OPIATES SCREEN, URINE: NEGATIVE
OXYCODONE INTERNAL CONTROL: ABNORMAL
OXYCODONE UR QL SCN: NEGATIVE
PCP UR QL SCN: NEGATIVE
PHENCYCLIDINE INTERNAL CONTROL: ABNORMAL
THC INTERNAL CONTROL: ABNORMAL

## 2024-08-21 PROCEDURE — 80305 DRUG TEST PRSMV DIR OPT OBS: CPT | Performed by: FAMILY MEDICINE

## 2024-08-21 PROCEDURE — 36415 COLL VENOUS BLD VENIPUNCTURE: CPT | Performed by: FAMILY MEDICINE

## 2024-08-22 LAB
BASOPHILS # BLD AUTO: 0 X10E3/UL (ref 0–0.2)
BASOPHILS NFR BLD AUTO: 0 %
BUN SERPL-MCNC: 9 MG/DL (ref 6–20)
BUN/CREAT SERPL: 9 (ref 9–23)
CALCIUM SERPL-MCNC: 9.3 MG/DL (ref 8.7–10.2)
CHLORIDE SERPL-SCNC: 101 MMOL/L (ref 96–106)
CO2 SERPL-SCNC: 21 MMOL/L (ref 20–29)
CREAT SERPL-MCNC: 1 MG/DL (ref 0.57–1)
EGFRCR SERPLBLD CKD-EPI 2021: 76 ML/MIN/1.73
EOSINOPHIL # BLD AUTO: 0.1 X10E3/UL (ref 0–0.4)
EOSINOPHIL NFR BLD AUTO: 1 %
ERYTHROCYTE [DISTWIDTH] IN BLOOD BY AUTOMATED COUNT: 13.2 % (ref 11.7–15.4)
GLUCOSE SERPL-MCNC: 109 MG/DL (ref 70–99)
HAV IGM SERPL QL IA: NEGATIVE
HBV CORE IGM SERPL QL IA: NEGATIVE
HBV SURFACE AG SERPL QL IA: NEGATIVE
HCT VFR BLD AUTO: 40.7 % (ref 34–46.6)
HCV AB SERPL QL IA: NORMAL
HCV IGG SERPL QL IA: NON REACTIVE
HGB BLD-MCNC: 12.6 G/DL (ref 11.1–15.9)
HIV 1+2 AB+HIV1 P24 AG SERPL QL IA: NON REACTIVE
HSV1 IGG SER IA-ACNC: <0.91 INDEX (ref 0–0.9)
HSV2 IGG SER IA-ACNC: <0.91 INDEX (ref 0–0.9)
IMM GRANULOCYTES # BLD AUTO: 0 X10E3/UL (ref 0–0.1)
IMM GRANULOCYTES NFR BLD AUTO: 0 %
IRON SATN MFR SERPL: 12 % (ref 15–55)
IRON SERPL-MCNC: 35 UG/DL (ref 27–159)
LYMPHOCYTES # BLD AUTO: 2.8 X10E3/UL (ref 0.7–3.1)
LYMPHOCYTES NFR BLD AUTO: 33 %
MCH RBC QN AUTO: 25.3 PG (ref 26.6–33)
MCHC RBC AUTO-ENTMCNC: 31 G/DL (ref 31.5–35.7)
MCV RBC AUTO: 82 FL (ref 79–97)
MONOCYTES # BLD AUTO: 0.6 X10E3/UL (ref 0.1–0.9)
MONOCYTES NFR BLD AUTO: 7 %
NEUTROPHILS # BLD AUTO: 5 X10E3/UL (ref 1.4–7)
NEUTROPHILS NFR BLD AUTO: 59 %
PLATELET # BLD AUTO: 337 X10E3/UL (ref 150–450)
POTASSIUM SERPL-SCNC: 4.5 MMOL/L (ref 3.5–5.2)
RBC # BLD AUTO: 4.98 X10E6/UL (ref 3.77–5.28)
RETICS/RBC NFR AUTO: 1.6 % (ref 0.6–2.6)
RPR SER QL: NON REACTIVE
SODIUM SERPL-SCNC: 136 MMOL/L (ref 134–144)
TIBC SERPL-MCNC: 295 UG/DL (ref 250–450)
UIBC SERPL-MCNC: 260 UG/DL (ref 131–425)
WBC # BLD AUTO: 8.5 X10E3/UL (ref 3.4–10.8)

## 2024-08-23 ENCOUNTER — TELEMEDICINE (OUTPATIENT)
Dept: FAMILY MEDICINE CLINIC | Facility: CLINIC | Age: 34
End: 2024-08-23
Payer: COMMERCIAL

## 2024-08-23 DIAGNOSIS — D50.9 IRON DEFICIENCY ANEMIA, UNSPECIFIED IRON DEFICIENCY ANEMIA TYPE: ICD-10-CM

## 2024-08-23 DIAGNOSIS — F90.2 ATTENTION DEFICIT HYPERACTIVITY DISORDER (ADHD), COMBINED TYPE: Primary | ICD-10-CM

## 2024-08-23 DIAGNOSIS — E66.01 MORBID OBESITY: ICD-10-CM

## 2024-08-23 PROCEDURE — 99214 OFFICE O/P EST MOD 30 MIN: CPT | Performed by: FAMILY MEDICINE

## 2024-08-23 RX ORDER — OMEPRAZOLE 40 MG/1
40 CAPSULE, DELAYED RELEASE ORAL DAILY
Qty: 90 CAPSULE | Refills: 1 | Status: SHIPPED | OUTPATIENT
Start: 2024-08-23

## 2024-08-23 RX ORDER — DEXTROAMPHETAMINE SACCHARATE, AMPHETAMINE ASPARTATE MONOHYDRATE, DEXTROAMPHETAMINE SULFATE AND AMPHETAMINE SULFATE 5; 5; 5; 5 MG/1; MG/1; MG/1; MG/1
20 CAPSULE, EXTENDED RELEASE ORAL EVERY MORNING
Qty: 30 CAPSULE | Refills: 0 | Status: SHIPPED | OUTPATIENT
Start: 2024-08-23

## 2024-08-23 NOTE — PROGRESS NOTES
Patient was seen today through synchronous audio/video technology. Verbal consent was obtained. The patient was located at home. Dr. Martínez was located at [facility] in [place], [place]. Vitals signs were not obtained due to lack of home monitoring access.         Date: 2024   Patient Name: Nelson Rendon  : 1990   MRN: [id number]     Chief Complaint:    Chief Complaint   Patient presents with    ADHD     Controlled substance refill        History of Present Illness  Patient presents for follow-up of ADHD.  She requests refill of Adderall XR 20 mg which she reports continues to work well without side effects.    Most recent labs revealed persistent significant iron deficiency.  She has been unable to tolerate any preparation of p.o. iron supplementation due to GI side effects.  At this time, she is interested in being referred to discuss iron infusions as she feels that this is likely affecting or causing some of her pain.  Most recent hemoglobin was 12.6.    She would also like to try getting set bound again.  She was unable to afford the cost however was not aware of the co-pay card through the pharmaceutical company and would like to try to see if this brings the cost down.      Review of Systems:   Review of Systems   Constitutional:  Positive for fatigue and unexpected weight gain. Negative for fever.   Respiratory:  Negative for cough and shortness of breath.    Cardiovascular:  Negative for chest pain.   Gastrointestinal:  Negative for abdominal pain.   Neurological:  Negative for dizziness and headache.   Psychiatric/Behavioral:  Negative for depressed mood. The patient is not nervous/anxious.        Past Medical History:   Past Medical History:   Diagnosis Date    ADHD (attention deficit hyperactivity disorder)     GERD (gastroesophageal reflux disease)     Obesity        Past Surgical History:   Past Surgical History:   Procedure Laterality Date    CHOLECYSTECTOMY          Family History:   Family History   Problem Relation Age of Onset    Hypertension Father     Obesity Father     Diabetes Brother     Obesity Brother     Hypertension Brother     Diabetes Other     Obesity Other        Social History:   Social History     Socioeconomic History    Marital status: Single   Tobacco Use    Smoking status: Never    Smokeless tobacco: Never   Vaping Use    Vaping status: Never Used   Substance and Sexual Activity    Alcohol use: Not Currently     Alcohol/week: 3.0 standard drinks of alcohol    Drug use: Never    Sexual activity: Defer       Medications:     Current Outpatient Medications:     amphetamine-dextroamphetamine XR (Adderall XR) 20 MG 24 hr capsule, Take 1 capsule by mouth Every Morning, Disp: 30 capsule, Rfl: 0    omeprazole (priLOSEC) 40 MG capsule, Take 1 capsule by mouth Daily., Disp: 90 capsule, Rfl: 1    Tirzepatide-Weight Management (ZEPBOUND) 2.5 MG/0.5ML solution auto-injector, Inject 0.5 mL under the skin into the appropriate area as directed 1 (One) Time Per Week., Disp: 2 mL, Rfl: 0    Tirzepatide-Weight Management (ZEPBOUND) 5 MG/0.5ML solution auto-injector, Inject 0.5 mL under the skin into the appropriate area as directed 1 (One) Time Per Week., Disp: 2 mL, Rfl: 1    Tirzepatide-Weight Management (ZEPBOUND) 7.5 MG/0.5ML solution auto-injector, Inject 0.5 mL under the skin into the appropriate area as directed 1 (One) Time Per Week., Disp: 2 mL, Rfl: 1    Allergies:   No Known Allergies      Physical Exam:  Vital Signs: There were no vitals filed for this visit.  There is no height or weight on file to calculate BMI.     Physical Exam  Vitals and nursing note reviewed.   Constitutional:       Appearance: Normal appearance.   HENT:      Head: Normocephalic.   Pulmonary:      Effort: Pulmonary effort is normal.   Neurological:      Mental Status: She is alert and oriented to person, place, and time.   Psychiatric:         Mood and Affect: Mood normal.          Behavior: Behavior normal.           Assessment/Plan:   Diagnoses and all orders for this visit:    1. Attention deficit hyperactivity disorder (ADHD), combined type (Primary)  Assessment & Plan:  UDS and CSA are up-to-date.  Marlon reviewed.  Doing well on current dose. Unable to wean medications. Discussed behavioral modifications and possibilty of medication vacations. Prescription sent to pharmacy. Will recheck in 3 months.       Orders:  -     amphetamine-dextroamphetamine XR (Adderall XR) 20 MG 24 hr capsule; Take 1 capsule by mouth Every Morning  Dispense: 30 capsule; Refill: 0    2. Iron deficiency anemia, unspecified iron deficiency anemia type  Assessment & Plan:  Persistent iron deficiency although not anemic at this time.  Patient would like to consult with hematology to discuss potential benefit of iron infusions.  Referred to Dr. Conway    Orders:  -     Ambulatory Referral to Hematology / Oncology    3. Morbid obesity  Assessment & Plan:  Patient's (There is no height or weight on file to calculate BMI.) indicates that they are morbidly/severely obese (BMI > 40 or > 35 with obesity - related health condition) with health conditions that include none . Weight is unchanged. BMI  is above average; BMI management plan is completed. We discussed portion control and increasing exercise. Rx Zepbound, pt shown how to use the pen and we discussing dosing, SE, and storage. F/u in 3 months.     Orders:  -     Tirzepatide-Weight Management (ZEPBOUND) 2.5 MG/0.5ML solution auto-injector; Inject 0.5 mL under the skin into the appropriate area as directed 1 (One) Time Per Week.  Dispense: 2 mL; Refill: 0  -     Tirzepatide-Weight Management (ZEPBOUND) 5 MG/0.5ML solution auto-injector; Inject 0.5 mL under the skin into the appropriate area as directed 1 (One) Time Per Week.  Dispense: 2 mL; Refill: 1  -     Tirzepatide-Weight Management (ZEPBOUND) 7.5 MG/0.5ML solution auto-injector; Inject 0.5 mL under the skin  into the appropriate area as directed 1 (One) Time Per Week.  Dispense: 2 mL; Refill: 1    Other orders  -     omeprazole (priLOSEC) 40 MG capsule; Take 1 capsule by mouth Daily.  Dispense: 90 capsule; Refill: 1           Follow Up:   Return in about 3 months (around 11/23/2024) for  Refill, Weight Check.        Mirna Martínez,   [facility]

## 2024-08-23 NOTE — ASSESSMENT & PLAN NOTE
Persistent iron deficiency although not anemic at this time.  Patient would like to consult with hematology to discuss potential benefit of iron infusions.  Referred to Dr. Conway

## 2024-08-23 NOTE — ASSESSMENT & PLAN NOTE
Patient's (There is no height or weight on file to calculate BMI.) indicates that they are morbidly/severely obese (BMI > 40 or > 35 with obesity - related health condition) with health conditions that include none . Weight is unchanged. BMI  is above average; BMI management plan is completed. We discussed portion control and increasing exercise. Rx Zepbound, pt shown how to use the pen and we discussing dosing, SE, and storage. F/u in 3 months.

## 2024-08-23 NOTE — ASSESSMENT & PLAN NOTE
UDS and CSA are up-to-date.  Marlon reviewed.  Doing well on current dose. Unable to wean medications. Discussed behavioral modifications and possibilty of medication vacations. Prescription sent to pharmacy. Will recheck in 3 months.

## 2024-09-10 RX ORDER — OMEPRAZOLE 40 MG/1
40 CAPSULE, DELAYED RELEASE ORAL
Qty: 90 CAPSULE | Refills: 1 | Status: SHIPPED | OUTPATIENT
Start: 2024-09-10

## 2024-10-01 DIAGNOSIS — F90.2 ATTENTION DEFICIT HYPERACTIVITY DISORDER (ADHD), COMBINED TYPE: ICD-10-CM

## 2024-10-01 RX ORDER — DEXTROAMPHETAMINE SACCHARATE, AMPHETAMINE ASPARTATE MONOHYDRATE, DEXTROAMPHETAMINE SULFATE AND AMPHETAMINE SULFATE 5; 5; 5; 5 MG/1; MG/1; MG/1; MG/1
20 CAPSULE, EXTENDED RELEASE ORAL EVERY MORNING
Qty: 30 CAPSULE | Refills: 0 | Status: SHIPPED | OUTPATIENT
Start: 2024-10-01

## 2025-01-08 DIAGNOSIS — E66.01 MORBID OBESITY: ICD-10-CM

## 2025-01-08 DIAGNOSIS — F90.2 ATTENTION DEFICIT HYPERACTIVITY DISORDER (ADHD), COMBINED TYPE: ICD-10-CM

## 2025-01-09 RX ORDER — DEXTROAMPHETAMINE SACCHARATE, AMPHETAMINE ASPARTATE MONOHYDRATE, DEXTROAMPHETAMINE SULFATE AND AMPHETAMINE SULFATE 5; 5; 5; 5 MG/1; MG/1; MG/1; MG/1
20 CAPSULE, EXTENDED RELEASE ORAL EVERY MORNING
Qty: 30 CAPSULE | Refills: 0 | Status: SHIPPED | OUTPATIENT
Start: 2025-01-09

## 2025-02-13 ENCOUNTER — TELEPHONE (OUTPATIENT)
Dept: FAMILY MEDICINE CLINIC | Facility: CLINIC | Age: 35
End: 2025-02-13
Payer: COMMERCIAL

## 2025-02-13 NOTE — TELEPHONE ENCOUNTER
CALLED PATIENT TO CONFIRM APPT FOR TOMORROW LEFT VOICEMAIL ADN TOLD TO CALL BACK WITH ANY QUESTIONS BUT WE PLANNED TO SEE THEM TOMORROW

## 2025-02-14 ENCOUNTER — OFFICE VISIT (OUTPATIENT)
Dept: FAMILY MEDICINE CLINIC | Facility: CLINIC | Age: 35
End: 2025-02-14
Payer: COMMERCIAL

## 2025-02-14 VITALS
WEIGHT: 293 LBS | DIASTOLIC BLOOD PRESSURE: 82 MMHG | OXYGEN SATURATION: 100 % | HEIGHT: 66 IN | SYSTOLIC BLOOD PRESSURE: 134 MMHG | BODY MASS INDEX: 47.09 KG/M2 | RESPIRATION RATE: 16 BRPM | HEART RATE: 80 BPM

## 2025-02-14 DIAGNOSIS — Z63.6 CAREGIVER STRESS: ICD-10-CM

## 2025-02-14 DIAGNOSIS — D50.9 IRON DEFICIENCY ANEMIA, UNSPECIFIED IRON DEFICIENCY ANEMIA TYPE: ICD-10-CM

## 2025-02-14 DIAGNOSIS — Z79.899 ENCOUNTER FOR LONG-TERM CURRENT USE OF MEDICATION: ICD-10-CM

## 2025-02-14 DIAGNOSIS — F90.2 ATTENTION DEFICIT HYPERACTIVITY DISORDER (ADHD), COMBINED TYPE: ICD-10-CM

## 2025-02-14 DIAGNOSIS — E66.813 CLASS 3 SEVERE OBESITY DUE TO EXCESS CALORIES WITHOUT SERIOUS COMORBIDITY WITH BODY MASS INDEX (BMI) GREATER THAN OR EQUAL TO 70 IN ADULT: ICD-10-CM

## 2025-02-14 DIAGNOSIS — Z13.29 SCREENING FOR THYROID DISORDER: ICD-10-CM

## 2025-02-14 DIAGNOSIS — L68.0 HIRSUTISM: ICD-10-CM

## 2025-02-14 DIAGNOSIS — Z76.89 ENCOUNTER TO ESTABLISH CARE: Primary | ICD-10-CM

## 2025-02-14 DIAGNOSIS — Z13.220 LIPID SCREENING: ICD-10-CM

## 2025-02-14 DIAGNOSIS — E66.01 CLASS 3 SEVERE OBESITY DUE TO EXCESS CALORIES WITHOUT SERIOUS COMORBIDITY WITH BODY MASS INDEX (BMI) GREATER THAN OR EQUAL TO 70 IN ADULT: ICD-10-CM

## 2025-02-14 DIAGNOSIS — R73.03 PRE-DIABETES: ICD-10-CM

## 2025-02-14 LAB
AMPHET+METHAMPHET UR QL: NEGATIVE
AMPHETAMINE INTERNAL CONTROL: NORMAL
AMPHETAMINES UR QL: NEGATIVE
BARBITURATE INTERNAL CONTROL: NORMAL
BARBITURATES UR QL SCN: NEGATIVE
BENZODIAZ UR QL SCN: NEGATIVE
BENZODIAZEPINE INTERNAL CONTROL: NORMAL
BUPRENORPHINE INTERNAL CONTROL: NORMAL
BUPRENORPHINE SERPL-MCNC: NEGATIVE NG/ML
CANNABINOIDS SERPL QL: NEGATIVE
COCAINE INTERNAL CONTROL: NORMAL
COCAINE UR QL: NEGATIVE
EXPIRATION DATE: NORMAL
Lab: NORMAL
MDMA (ECSTASY) INTERNAL CONTROL: NORMAL
MDMA UR QL SCN: NEGATIVE
METHADONE INTERNAL CONTROL: NORMAL
METHADONE UR QL SCN: NEGATIVE
METHAMPHETAMINE INTERNAL CONTROL: NORMAL
MORPHINE INTERNAL CONTROL: NORMAL
MORPHINE/OPIATES SCREEN, URINE: NEGATIVE
OXYCODONE INTERNAL CONTROL: NORMAL
OXYCODONE UR QL SCN: NEGATIVE
PCP UR QL SCN: NEGATIVE
PHENCYCLIDINE INTERNAL CONTROL: NORMAL
THC INTERNAL CONTROL: NORMAL

## 2025-02-14 RX ORDER — LISDEXAMFETAMINE DIMESYLATE 30 MG/1
30 CAPSULE ORAL EVERY MORNING
Qty: 30 CAPSULE | Refills: 0 | Status: CANCELLED | OUTPATIENT
Start: 2025-02-14

## 2025-02-14 RX ORDER — LISDEXAMFETAMINE DIMESYLATE 30 MG/1
30 CAPSULE ORAL EVERY MORNING
Qty: 30 CAPSULE | Refills: 0 | Status: SHIPPED | OUTPATIENT
Start: 2025-02-14

## 2025-02-14 SDOH — SOCIAL STABILITY - SOCIAL INSECURITY: DEPENDENT RELATIVE NEEDING CARE AT HOME: Z63.6

## 2025-02-14 NOTE — PROGRESS NOTES
Office Note     Name: Nelson Rendon    : 1990     MRN: [id number]     Chief Complaint  Establish Care    Subjective   Patient or patient representative verbalized consent for the use of Ambient Listening during the visit with  Chelsey Holt PA-C for chart documentation. 3/1/2025  11:29 EST      Nelson Rendon is a 34 y.o. female.     The patient presents for care establishment and evaluation of ADHD, weight management, stress, iron deficiency, and suspected PCOS.    Previously under Dr. Martínez's care, she discontinued Adderall in 2024 due to lack of efficacy and adverse effects (anger, tachycardia). Despite a recent refill, she did not collect it. She discussed switching to Vyvanse. She reports difficulty focusing at work, impacting job performance. She feels better off Adderall but struggles with task completion and time management. She was tested for ADHD at [facility].    She has continued to gain weight. Initially hesitant to take Zepbound due to concerns about pancreatitis and side effects, she started it in 10/2024 or 2024, took it for a month, then had a 3-week interruption due to a supply shortage. She resumed at the lowest dose for a month and plans to increase the dose next week. The medication helps with food cravings, but she still experiences hunger. She aims for 100 g of protein and 35 g of fiber daily and is considering weight loss surgery as a last resort.    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  She feels exhausted and stressed. She does not believe she needs anxiety medication but finds symptoms harder to manage since discontinuing Adderall.     She has been referred to a specialist for iron deficiency, but she has not been able to reschedule her appointment.  She was supposed to get iron infusions since she was not absorbing it orally.    She suspects PCOS due to chin hair and is considering laser treatment.  Her menstrual cycles are inconsistent, with her most recent period being the first consistent one. She takes Ovasitol, which she believes helps regulate her periods. Her last pelvic exam was in 2023 by Dr. Cheney at [facility]. She is interested in checking her hormone levels.    Review of Systems:   Review of Systems   Constitutional:  Positive for fatigue and unexpected weight gain.   Genitourinary:  Positive for menstrual problem.   Psychiatric/Behavioral:  Positive for decreased concentration and stress. The patient is nervous/anxious.    All other systems reviewed and are negative.      Past Medical History:   Past Medical History:   Diagnosis Date    ADHD (attention deficit hyperactivity disorder)     GERD (gastroesophageal reflux disease)     Obesity        Past Surgical History:   Past Surgical History:   Procedure Laterality Date    CHOLECYSTECTOMY         Family History:   Family History   Problem Relation Age of Onset    Hypertension Father     Obesity Father     Diabetes Brother     Obesity Brother     Hypertension Brother     Diabetes Other     Obesity Other        Social History:   Social History     Socioeconomic History    Marital status: Single   Tobacco Use    Smoking status: Never     Passive exposure: Never    Smokeless tobacco: Never   Vaping Use    Vaping status: Never Used   Substance and Sexual Activity    Alcohol use: Not Currently     Alcohol/week: 3.0 standard drinks of alcohol    Drug use: Never    Sexual activity: Defer       Immunizations:   Immunization History   Administered Date(s) Administered    COVID-19 (MODERNA) 1st,2nd,3rd Dose Monovalent 08/27/2021, 09/27/2021    COVID-19 (MODERNA) Monovalent Original Booster 08/27/2021, 09/27/2021    DTP / HiB 01/10/1996    Hep B, Adolescent or Pediatric 11/06/2000, 12/07/2000, 05/01/2001    MMR 11/06/2000, 05/01/2001    OPV 01/10/1996    Tdap 03/08/2006, 07/07/2023        Medications:     Current Outpatient Medications:      "omeprazole (priLOSEC) 40 MG capsule, TAKE ONE CAPSULE BY MOUTH DAILY BEFORE A MEAL, Disp: 90 capsule, Rfl: 1    Tirzepatide-Weight Management (ZEPBOUND) 2.5 MG/0.5ML solution auto-injector, Inject 0.5 mL under the skin into the appropriate area as directed 1 (One) Time Per Week., Disp: 2 mL, Rfl: 0    lisdexamfetamine (Vyvanse) 30 MG capsule, Take 1 capsule by mouth Every Morning, Disp: 30 capsule, Rfl: 0    Tirzepatide-Weight Management (ZEPBOUND) 5 MG/0.5ML solution auto-injector, Inject 0.5 mL under the skin into the appropriate area as directed 1 (One) Time Per Week., Disp: 2 mL, Rfl: 1    Tirzepatide-Weight Management (ZEPBOUND) 7.5 MG/0.5ML solution auto-injector, Inject 0.5 mL under the skin into the appropriate area as directed 1 (One) Time Per Week., Disp: 2 mL, Rfl: 1    Allergies:   No Known Allergies    Objective     Vital Signs  /82 (BP Location: Right arm, Patient Position: Sitting)   Pulse 80   Resp 16   Ht 167.6 cm (66\")   Wt (!) 202 kg (445 lb)   SpO2 100%   BMI 71.82 kg/m²   Estimated body mass index is 71.82 kg/m² as calculated from the following:    Height as of this encounter: 167.6 cm (66\").    Weight as of this encounter: 202 kg (445 lb).         Physical Exam  Vitals and nursing note reviewed.   Constitutional:       General: She is not in acute distress.     Appearance: Normal appearance. She is obese. She is not ill-appearing.   HENT:      Head: Normocephalic and atraumatic.   Cardiovascular:      Rate and Rhythm: Normal rate and regular rhythm.      Pulses: Normal pulses.      Heart sounds: Normal heart sounds.   Pulmonary:      Effort: Pulmonary effort is normal. No respiratory distress.      Breath sounds: Normal breath sounds.   Musculoskeletal:      Right lower leg: No edema.      Left lower leg: No edema.   Skin:     General: Skin is warm and dry.   Neurological:      General: No focal deficit present.      Mental Status: She is alert and oriented to person, place, and " time.      Coordination: Coordination normal.      Gait: Gait normal.   Psychiatric:         Mood and Affect: Mood normal.         Behavior: Behavior normal.          Results:  Recent Results (from the past 24 hours)   POC Medline 12 Panel Urine Drug Screen    Collection Time: 02/14/25 11:36 AM    Specimen: Urine   Result Value Ref Range    Amphetamine Screen, Urine Negative Negative    AMP INTERNAL CONTROL Passed Passed    Barbiturates Screen, Urine Negative Negative    BARBITURATE INTERNAL CONTROL Passed Passed    Buprenorphine, Screen, Urine Negative Negative    BUPRENORPHINE INTERNAL CONTROL Passed Passed    Benzodiazepine Screen, Urine Negative Negative    BENZODIAZEPINE INTERNAL CONTROL Passed Passed    Cocaine Screen, Urine Negative Negative    COCAINE INTERNAL CONTROL Passed Passed    MDMA (ECSTASY) Negative Negative    MDMA (ECSTASY) INTERNAL CONTROL Passed Passed    Methamphetamine, Ur Negative Negative    METHAMPHETAMINE INTERNAL CONTROL Passed Passed    Morphine/Opiates Screen, Urine Negative Negative    MOR INTERNAL CONTROL Passed Passed    Methadone Screen, Urine Negative Negative    METHADONE INTERNAL CONTROL Passed Passed    Oxycodone Screen, Urine Negative Negative    OXYCODONE INTERNAL CONTROL Passed Passed    Phencyclidine (PCP), Urine Negative Negative    PHENCYCLIDINE INTERNAL CONTROL Passed Passed    THC, Screen, Urine Negative Negative    THC INTERNAL CONTROL Passed Passed    Lot Number [id number]     Expiration Date 06/10/2026        Assessment and Plan       Diagnoses and all orders for this visit:    1. Encounter to establish care (Primary)  Comments:  Previous records reviewed.    2. Attention deficit hyperactivity disorder (ADHD), combined type  Assessment & Plan:  Uncontrolled ADHD may contribute to depression and anxiety.  We will discontinue Adderall and start Vyvanse 30 mg. If beneficial but residual symptoms persist, consider additional medication for anxiety and depression  symptoms.    Orders:  -     lisdexamfetamine (Vyvanse) 30 MG capsule; Take 1 capsule by mouth Every Morning  Dispense: 30 capsule; Refill: 0    3. Class 3 severe obesity due to excess calories without serious comorbidity with body mass index (BMI) greater than or equal to 70 in adult  Assessment & Plan:  Patient's (Body mass index is 71.82 kg/m².) indicates that they are morbidly/severely obese (BMI > 40 or > 35 with obesity - related health condition) with health conditions that include GERD . Weight is worsening. BMI  is above average; BMI management plan is completed. We discussed portion control, increasing exercise, and resuming Zepbound . On lowest dose of Zepbound, plans to increase dose next week. Aims for 100 g of protein and 35 g of fiber daily.      4. Hirsutism  Assessment & Plan:  Reports irregular periods and chin hair. Blood work to assess hormone levels. Advised to schedule appointment with GYN.    Orders:  -     Testosterone Free Direct; Future  -     Follicle stimulating hormone; Future  -     Luteinizing hormone; Future  -     Prolactin; Future  -     Prolactin  -     Luteinizing hormone  -     Follicle stimulating hormone  -     Testosterone Free Direct    5. Caregiver stress  Assessment & Plan:  Significant stress from caregiving, financial concerns, and brother's impending release. Does not currently need anxiety medication but has considered it.      6. Iron deficiency anemia, unspecified iron deficiency anemia type  Assessment & Plan:  History of low iron levels, referred to hematologist but not yet scheduled. Advised to follow up for potential iron infusions. Blood work to assess iron levels.    Orders:  -     CBC & Differential; Future  -     Iron Profile; Future  -     Iron Profile  -     CBC & Differential    7. Encounter for long-term current use of medication  Overview:  UDS up-to-date as of 2/14/2025.    Assessment & Plan:  Marlon reviewed and compliant.  Request  #[id number]    Orders:  -     POC Medline 12 Panel Urine Drug Screen    8. Pre-diabetes  -     CBC & Differential; Future  -     Comprehensive Metabolic Panel; Future  -     Hemoglobin A1c; Future  -     Hemoglobin A1c  -     Comprehensive Metabolic Panel  -     CBC & Differential    9. Screening for thyroid disorder  -     Thyroid Cascade Profile; Future  -     Thyroid Cascade Profile    10. Lipid screening  -     Lipid Panel; Future  -     Lipid Panel        Follow Up  Return in about 4 weeks (around 3/14/2025) for recheck ADHD.    Cehlsey Holt PA-C  [facility] Internal Medicine [facility]

## 2025-02-14 NOTE — ASSESSMENT & PLAN NOTE
Uncontrolled ADHD may contribute to depression and anxiety.  We will discontinue Adderall and start Vyvanse 30 mg. If beneficial but residual symptoms persist, consider additional medication for anxiety and depression symptoms.

## 2025-02-20 LAB
ALBUMIN SERPL-MCNC: 4 G/DL (ref 3.9–4.9)
ALP SERPL-CCNC: 73 IU/L (ref 44–121)
ALT SERPL-CCNC: 9 IU/L (ref 0–32)
AST SERPL-CCNC: 16 IU/L (ref 0–40)
BASOPHILS # BLD AUTO: 0 X10E3/UL (ref 0–0.2)
BASOPHILS NFR BLD AUTO: 1 %
BILIRUB SERPL-MCNC: 0.2 MG/DL (ref 0–1.2)
BUN SERPL-MCNC: 10 MG/DL (ref 6–20)
BUN/CREAT SERPL: 11 (ref 9–23)
CALCIUM SERPL-MCNC: 9.2 MG/DL (ref 8.7–10.2)
CHLORIDE SERPL-SCNC: 101 MMOL/L (ref 96–106)
CHOLEST SERPL-MCNC: 216 MG/DL (ref 100–199)
CO2 SERPL-SCNC: 22 MMOL/L (ref 20–29)
CREAT SERPL-MCNC: 0.89 MG/DL (ref 0.57–1)
EGFRCR SERPLBLD CKD-EPI 2021: 87 ML/MIN/1.73
EOSINOPHIL # BLD AUTO: 0.1 X10E3/UL (ref 0–0.4)
EOSINOPHIL NFR BLD AUTO: 2 %
ERYTHROCYTE [DISTWIDTH] IN BLOOD BY AUTOMATED COUNT: 13.4 % (ref 11.7–15.4)
FSH SERPL-ACNC: 6.2 MIU/ML
GLOBULIN SER CALC-MCNC: 3.5 G/DL (ref 1.5–4.5)
GLUCOSE SERPL-MCNC: 93 MG/DL (ref 70–99)
HBA1C MFR BLD: 5.9 % (ref 4.8–5.6)
HCT VFR BLD AUTO: 42.7 % (ref 34–46.6)
HDLC SERPL-MCNC: 42 MG/DL
HGB BLD-MCNC: 13.4 G/DL (ref 11.1–15.9)
IMM GRANULOCYTES # BLD AUTO: 0 X10E3/UL (ref 0–0.1)
IMM GRANULOCYTES NFR BLD AUTO: 0 %
IRON SATN MFR SERPL: 14 % (ref 15–55)
IRON SERPL-MCNC: 46 UG/DL (ref 27–159)
LDLC SERPL CALC-MCNC: 142 MG/DL (ref 0–99)
LH SERPL-ACNC: 7.6 MIU/ML
LYMPHOCYTES # BLD AUTO: 2.7 X10E3/UL (ref 0.7–3.1)
LYMPHOCYTES NFR BLD AUTO: 39 %
MCH RBC QN AUTO: 25.1 PG (ref 26.6–33)
MCHC RBC AUTO-ENTMCNC: 31.4 G/DL (ref 31.5–35.7)
MCV RBC AUTO: 80 FL (ref 79–97)
MONOCYTES # BLD AUTO: 0.5 X10E3/UL (ref 0.1–0.9)
MONOCYTES NFR BLD AUTO: 7 %
NEUTROPHILS # BLD AUTO: 3.4 X10E3/UL (ref 1.4–7)
NEUTROPHILS NFR BLD AUTO: 51 %
PLATELET # BLD AUTO: 332 X10E3/UL (ref 150–450)
POTASSIUM SERPL-SCNC: 4.7 MMOL/L (ref 3.5–5.2)
PROLACTIN SERPL-MCNC: 6.2 NG/ML (ref 4.8–33.4)
PROT SERPL-MCNC: 7.5 G/DL (ref 6–8.5)
RBC # BLD AUTO: 5.34 X10E6/UL (ref 3.77–5.28)
SODIUM SERPL-SCNC: 137 MMOL/L (ref 134–144)
TESTOST FREE SERPL-MCNC: 5.6 PG/ML (ref 0–4.2)
TIBC SERPL-MCNC: 320 UG/DL (ref 250–450)
TRIGL SERPL-MCNC: 177 MG/DL (ref 0–149)
TSH SERPL DL<=0.005 MIU/L-ACNC: 2.66 UIU/ML (ref 0.45–4.5)
UIBC SERPL-MCNC: 274 UG/DL (ref 131–425)
VLDLC SERPL CALC-MCNC: 32 MG/DL (ref 5–40)
WBC # BLD AUTO: 6.7 X10E3/UL (ref 3.4–10.8)

## 2025-03-01 PROBLEM — E66.01 CLASS 3 SEVERE OBESITY DUE TO EXCESS CALORIES WITHOUT SERIOUS COMORBIDITY WITH BODY MASS INDEX (BMI) GREATER THAN OR EQUAL TO 70 IN ADULT: Status: ACTIVE | Noted: 2025-03-01

## 2025-03-01 PROBLEM — Z63.6 CAREGIVER STRESS: Status: ACTIVE | Noted: 2025-03-01

## 2025-03-01 PROBLEM — E66.813 CLASS 3 SEVERE OBESITY DUE TO EXCESS CALORIES WITHOUT SERIOUS COMORBIDITY WITH BODY MASS INDEX (BMI) GREATER THAN OR EQUAL TO 70 IN ADULT: Status: ACTIVE | Noted: 2025-03-01

## 2025-03-01 NOTE — ASSESSMENT & PLAN NOTE
History of low iron levels, referred to hematologist but not yet scheduled. Advised to follow up for potential iron infusions. Blood work to assess iron levels.

## 2025-03-01 NOTE — ASSESSMENT & PLAN NOTE
Problem: Physical Therapy  Goal: Physical Therapy Goal  Description: Goals to be met by: 24, extend through 24    Patient will maintain functional independence with mobility, without evidence of deconditioning, by performin. Supine to sit with [place]  2. Sit to stand transfer with [place]  3. Gait  x 400 feet with [place]   4. Ascend/Descend 6 inch curb step with [place]  5. Stand for 10 minutes with [place]  6. Lower extremity exercise program x30 reps per handout, with assistance as needed    Outcome: Progressing    Extension through . Goals remain appropriate.      Reports irregular periods and chin hair. Blood work to assess hormone levels. Advised to schedule appointment with GYN.

## 2025-03-01 NOTE — ASSESSMENT & PLAN NOTE
Significant stress from caregiving, financial concerns, and brother's impending release. Does not currently need anxiety medication but has considered it.

## 2025-03-01 NOTE — ASSESSMENT & PLAN NOTE
Patient's (Body mass index is 71.82 kg/m².) indicates that they are morbidly/severely obese (BMI > 40 or > 35 with obesity - related health condition) with health conditions that include GERD . Weight is worsening. BMI  is above average; BMI management plan is completed. We discussed portion control, increasing exercise, and resuming Zepbound . On lowest dose of Zepbound, plans to increase dose next week. Aims for 100 g of protein and 35 g of fiber daily.

## 2025-03-12 NOTE — ASSESSMENT & PLAN NOTE
Marlon reviewed and compliant.  Request #[id number]   Paola, call her and see what's going on, may follow up with me

## 2025-03-14 ENCOUNTER — OFFICE VISIT (OUTPATIENT)
Dept: FAMILY MEDICINE CLINIC | Facility: CLINIC | Age: 35
End: 2025-03-14
Payer: COMMERCIAL

## 2025-03-14 VITALS
SYSTOLIC BLOOD PRESSURE: 118 MMHG | OXYGEN SATURATION: 98 % | DIASTOLIC BLOOD PRESSURE: 82 MMHG | HEIGHT: 66 IN | WEIGHT: 293 LBS | BODY MASS INDEX: 47.09 KG/M2 | HEART RATE: 84 BPM

## 2025-03-14 DIAGNOSIS — E66.813 CLASS 3 SEVERE OBESITY DUE TO EXCESS CALORIES WITHOUT SERIOUS COMORBIDITY WITH BODY MASS INDEX (BMI) GREATER THAN OR EQUAL TO 70 IN ADULT: ICD-10-CM

## 2025-03-14 DIAGNOSIS — Z79.899 ENCOUNTER FOR LONG-TERM CURRENT USE OF MEDICATION: Primary | ICD-10-CM

## 2025-03-14 DIAGNOSIS — E66.01 CLASS 3 SEVERE OBESITY DUE TO EXCESS CALORIES WITHOUT SERIOUS COMORBIDITY WITH BODY MASS INDEX (BMI) GREATER THAN OR EQUAL TO 70 IN ADULT: ICD-10-CM

## 2025-03-14 DIAGNOSIS — E78.5 ELEVATED LIPIDS: ICD-10-CM

## 2025-03-14 DIAGNOSIS — F90.2 ATTENTION DEFICIT HYPERACTIVITY DISORDER (ADHD), COMBINED TYPE: ICD-10-CM

## 2025-03-14 RX ORDER — LISDEXAMFETAMINE DIMESYLATE 30 MG/1
30 CAPSULE ORAL EVERY MORNING
Qty: 30 CAPSULE | Refills: 0 | Status: SHIPPED | OUTPATIENT
Start: 2025-03-14

## 2025-03-14 NOTE — ASSESSMENT & PLAN NOTE
Cholesterol slightly increased; LDL high, VLDL and HDL normal. Continue weight loss and dietary changes. Follow-up cholesterol check in a few months.

## 2025-03-14 NOTE — ASSESSMENT & PLAN NOTE
Vyvanse helps focus and task completion but possibly causes some mild agitation, but this is most likely related to menstrual cycle. Maintain Vyvanse 30 mg to determine if agitation is a side effect or menstrual symptom. Consider dosage increase if agitation not related to Vyvanse.

## 2025-03-14 NOTE — PROGRESS NOTES
Office Note     Name: Nelson Rendon    : 1990     MRN: [id number]     Chief Complaint  ADHD (Follow up) and Weight Management (Taking Zepbound 5 mg currently)    Subjective   Patient or patient representative verbalized consent for the use of Ambient Listening during the visit with  Chelsey Holt PA-C for chart documentation. 3/14/2025  16:13 EDT      Nelson Rendon is a 34 y.o. female.     The patient presents for weight management, ADHD, and elevated cholesterol.    She has lost 11 pounds, attributing it to her medication regimen. She reports mild constipation related to dietary changes and has not followed up with her hematologist. She has been on Zepbound 2.5 mg twice and recently started Zepbound 5 mg last month.    She is considering increasing her Vyvanse dosage from 30 mg to 40 mg, finding it beneficial for focus and productivity. She has noticed a big difference at work. She reports no palpitations or chest pain but has noticed increased agitation, possibly due to medication or menstrual cycle. She recalls anger with Adderall but not with Vyvanse.     She is concerned about elevated cholesterol levels.    MEDICATIONS  - Current: Vyvanse  - Current: Zepbound  - Past: Adderall    Review of Systems:   Review of Systems   Constitutional:  Negative for appetite change, chills, diaphoresis, fatigue, fever and unexpected weight loss.   HENT:  Negative for congestion, sore throat and swollen glands.    Respiratory:  Negative for cough.    Cardiovascular:  Negative for chest pain and palpitations.   Gastrointestinal:  Negative for abdominal pain, nausea and vomiting.   Genitourinary:  Negative for dysuria.   Musculoskeletal:  Negative for myalgias and neck pain.   Skin:  Negative for rash.   Neurological:  Negative for weakness and numbness.   Psychiatric/Behavioral:  Positive for agitation (slightly irritable, also on menses). Negative for decreased concentration, sleep disturbance and negative for  hyperactivity. The patient is not nervous/anxious.        Past Medical History:   Past Medical History:   Diagnosis Date    ADHD (attention deficit hyperactivity disorder)     GERD (gastroesophageal reflux disease)     Obesity        Past Surgical History:   Past Surgical History:   Procedure Laterality Date    CHOLECYSTECTOMY         Family History:   Family History   Problem Relation Age of Onset    Hypertension Father     Obesity Father     Diabetes Brother     Obesity Brother     Hypertension Brother     Diabetes Other     Obesity Other        Social History:   Social History     Socioeconomic History    Marital status: Single   Tobacco Use    Smoking status: Never     Passive exposure: Never    Smokeless tobacco: Never   Vaping Use    Vaping status: Never Used   Substance and Sexual Activity    Alcohol use: Not Currently     Alcohol/week: 3.0 standard drinks of alcohol    Drug use: Never    Sexual activity: Defer       Immunizations:   Immunization History   Administered Date(s) Administered    COVID-19 (MODERNA) 1st,2nd,3rd Dose Monovalent 08/27/2021, 09/27/2021    COVID-19 (MODERNA) Monovalent Original Booster 08/27/2021, 09/27/2021    DTP / HiB 01/10/1996    Hep B, Adolescent or Pediatric 11/06/2000, 12/07/2000, 05/01/2001    MMR 11/06/2000, 05/01/2001    OPV 01/10/1996    Tdap 03/08/2006, 07/07/2023        Medications:     Current Outpatient Medications:     lisdexamfetamine (Vyvanse) 30 MG capsule, Take 1 capsule by mouth Every Morning, Disp: 30 capsule, Rfl: 0    omeprazole (priLOSEC) 40 MG capsule, TAKE ONE CAPSULE BY MOUTH DAILY BEFORE A MEAL, Disp: 90 capsule, Rfl: 1    Tirzepatide-Weight Management (ZEPBOUND) 5 MG/0.5ML solution auto-injector, Inject 0.5 mL under the skin into the appropriate area as directed 1 (One) Time Per Week., Disp: 2 mL, Rfl: 1    Tirzepatide-Weight Management (ZEPBOUND) 7.5 MG/0.5ML solution auto-injector, Inject 0.5 mL under the skin into the appropriate area as directed 1  "(One) Time Per Week. (Patient not taking: Reported on 3/14/2025), Disp: 2 mL, Rfl: 1    Allergies:   No Known Allergies    Objective     Vital Signs  /82   Pulse 84   Ht 167.6 cm (66\")   Wt (!) 197 kg (434 lb 6.4 oz)   SpO2 98%   BMI 70.11 kg/m²   Estimated body mass index is 70.11 kg/m² as calculated from the following:    Height as of this encounter: 167.6 cm (66\").    Weight as of this encounter: 197 kg (434 lb 6.4 oz).            Physical Exam  Vitals and nursing note reviewed.   Constitutional:       General: She is not in acute distress.     Appearance: Normal appearance. She is obese. She is not ill-appearing.   HENT:      Head: Normocephalic and atraumatic.   Cardiovascular:      Rate and Rhythm: Normal rate and regular rhythm.      Pulses: Normal pulses.      Heart sounds: Normal heart sounds.   Pulmonary:      Effort: Pulmonary effort is normal. No respiratory distress.      Breath sounds: Normal breath sounds.   Musculoskeletal:      Right lower leg: No edema.      Left lower leg: No edema.   Skin:     General: Skin is warm and dry.   Neurological:      General: No focal deficit present.      Mental Status: She is alert and oriented to person, place, and time.      Coordination: Coordination normal.      Gait: Gait normal.   Psychiatric:         Mood and Affect: Mood normal.         Behavior: Behavior normal.             Results:  No results found for this or any previous visit (from the past 24 hours).       Assessment and Plan       Diagnoses and all orders for this visit:    1. Encounter for long-term current use of medication (Primary)  Overview:  UDS up-to-date as of 2/14/2025.    CSA signed on 3/14/2025.    Assessment & Plan:  Marlon reviewed and compliant.  Request #[id number]      2. Attention deficit hyperactivity disorder (ADHD), combined type  Assessment & Plan:  Vyvanse helps focus and task completion but possibly causes some mild agitation, but this is most likely related to " menstrual cycle. Maintain Vyvanse 30 mg to determine if agitation is a side effect or menstrual symptom. Consider dosage increase if agitation not related to Vyvanse.      Orders:  -     lisdexamfetamine (Vyvanse) 30 MG capsule; Take 1 capsule by mouth Every Morning  Dispense: 30 capsule; Refill: 0    3. Class 3 severe obesity due to excess calories without serious comorbidity with body mass index (BMI) greater than or equal to 70 in adult  Assessment & Plan:  Patient's (Body mass index is 70.11 kg/m².) indicates that they are morbidly/severely obese (BMI > 40 or > 35 with obesity - related health condition) with health conditions that include GERD . Weight is improving with treatment. BMI  is above average; BMI management plan is completed. We discussed portion control, increasing exercise, and continuing Zepbound .  Lost 11 pounds in a month. Continue Zepbound 5 mg for another month, then increase to 7.5 mg.  We will reevaluate in 8 weeks.      4. Elevated lipids  Assessment & Plan:  Cholesterol slightly increased; LDL high, VLDL and HDL normal. Continue weight loss and dietary changes. Follow-up cholesterol check in a few months.          Follow Up  Return in about 8 weeks (around 5/9/2025) for recheck ADHD and weight management.    Chelsey Holt PA-C  [facility] Internal Medicine [facility]

## 2025-03-14 NOTE — ASSESSMENT & PLAN NOTE
Patient's (Body mass index is 70.11 kg/m².) indicates that they are morbidly/severely obese (BMI > 40 or > 35 with obesity - related health condition) with health conditions that include GERD . Weight is improving with treatment. BMI  is above average; BMI management plan is completed. We discussed portion control, increasing exercise, and continuing Zepbound .  Lost 11 pounds in a month. Continue Zepbound 5 mg for another month, then increase to 7.5 mg.  We will reevaluate in 8 weeks.

## 2025-03-17 ENCOUNTER — TELEPHONE (OUTPATIENT)
Dept: FAMILY MEDICINE CLINIC | Facility: CLINIC | Age: 35
End: 2025-03-17
Payer: COMMERCIAL

## 2025-03-17 NOTE — TELEPHONE ENCOUNTER
----- Message from Chelsey Holt sent at 3/14/2025  4:57 PM EDT -----  Please contact her to schedule follow-up in 8 weeks.  Thank you!

## 2025-04-07 ENCOUNTER — TELEPHONE (OUTPATIENT)
Dept: FAMILY MEDICINE CLINIC | Facility: CLINIC | Age: 35
End: 2025-04-07
Payer: COMMERCIAL

## 2025-04-11 DIAGNOSIS — F90.2 ATTENTION DEFICIT HYPERACTIVITY DISORDER (ADHD), COMBINED TYPE: ICD-10-CM

## 2025-04-11 RX ORDER — LISDEXAMFETAMINE DIMESYLATE 40 MG/1
40 CAPSULE ORAL EVERY MORNING
Qty: 30 CAPSULE | Refills: 0 | Status: SHIPPED | OUTPATIENT
Start: 2025-04-11

## 2025-05-13 ENCOUNTER — TELEMEDICINE (OUTPATIENT)
Dept: FAMILY MEDICINE CLINIC | Facility: CLINIC | Age: 35
End: 2025-05-13
Payer: COMMERCIAL

## 2025-05-13 DIAGNOSIS — E66.01 CLASS 3 SEVERE OBESITY DUE TO EXCESS CALORIES WITHOUT SERIOUS COMORBIDITY WITH BODY MASS INDEX (BMI) GREATER THAN OR EQUAL TO 70 IN ADULT: ICD-10-CM

## 2025-05-13 DIAGNOSIS — F90.2 ATTENTION DEFICIT HYPERACTIVITY DISORDER (ADHD), COMBINED TYPE: Primary | ICD-10-CM

## 2025-05-13 DIAGNOSIS — E66.813 CLASS 3 SEVERE OBESITY DUE TO EXCESS CALORIES WITHOUT SERIOUS COMORBIDITY WITH BODY MASS INDEX (BMI) GREATER THAN OR EQUAL TO 70 IN ADULT: ICD-10-CM

## 2025-05-13 DIAGNOSIS — Z79.899 ENCOUNTER FOR LONG-TERM CURRENT USE OF MEDICATION: ICD-10-CM

## 2025-05-13 DIAGNOSIS — K21.9 GASTROESOPHAGEAL REFLUX DISEASE, UNSPECIFIED WHETHER ESOPHAGITIS PRESENT: ICD-10-CM

## 2025-05-13 PROCEDURE — 99214 OFFICE O/P EST MOD 30 MIN: CPT | Performed by: PHYSICIAN ASSISTANT

## 2025-05-13 RX ORDER — LISDEXAMFETAMINE DIMESYLATE 40 MG/1
40 CAPSULE ORAL EVERY MORNING
Qty: 30 CAPSULE | Refills: 0 | Status: SHIPPED | OUTPATIENT
Start: 2025-05-13

## 2025-05-13 NOTE — PROGRESS NOTES
Office Note     Name: Nelson Rendon    : 1990     MRN: [id number]     Chief Complaint  ADHD (Follow up) and Obesity (Zepbound not covered. Insurance said they would cover Ozempic, Wegovy, Saxenda, Qsymia)    Subjective   Patient or patient representative verbalized consent for the use of Ambient Listening during the visit with  Chelsey Holt PA-C for chart documentation. 2025  16:21 EDT    Patient was seen today through synchronous audio/video technology. Verbal consent was obtained. The patient was located at  [facility] with her mother . Chelsey Holt PA-C was located at [facility] in [place], [place]. Vitals signs were not obtained due to lack of home monitoring access.     Nelson Rendon is a 34 y.o. female.     The patient presents via virtual visit for weight management and ADHD.    She reports significant improvement with Vyvanse.  She has one remaining Vyvanse capsule and does not wish to alter the dosage. She feels that she has been well-controlled with current medication.    She was also doing well with Zepbound and has lost about 11 pounds.  Her insurance will no longer cover Zepbound, and she is considering transitioning to Wegovy. She experienced mild constipation and nausea on a 7.5 mg dose of Zepbound.    She has not taken omeprazole recently but may need it soon.    She mentions stress due to her mother's recent healthcare situation but feels more at peace now that her mother is receiving better care at [facility].    Review of Systems:   Review of Systems   Constitutional:  Negative for appetite change and unexpected weight loss.   Cardiovascular:  Negative for palpitations.   Psychiatric/Behavioral:  Negative for agitation, decreased concentration, sleep disturbance and negative for hyperactivity. The patient is not nervous/anxious.        Past Medical History:   Past Medical History:   Diagnosis Date    ADHD (attention deficit hyperactivity disorder)      GERD (gastroesophageal reflux disease)     Obesity        Past Surgical History:   Past Surgical History:   Procedure Laterality Date    CHOLECYSTECTOMY         Family History:   Family History   Problem Relation Age of Onset    Hypertension Father     Obesity Father     Diabetes Brother     Obesity Brother     Hypertension Brother     Diabetes Other     Obesity Other        Social History:   Social History     Socioeconomic History    Marital status: Single   Tobacco Use    Smoking status: Never     Passive exposure: Never    Smokeless tobacco: Never   Vaping Use    Vaping status: Never Used   Substance and Sexual Activity    Alcohol use: Not Currently     Alcohol/week: 3.0 standard drinks of alcohol    Drug use: Never    Sexual activity: Defer       Immunizations:   Immunization History   Administered Date(s) Administered    COVID-19 (MODERNA) 1st,2nd,3rd Dose Monovalent 08/27/2021, 09/27/2021    COVID-19 (MODERNA) Monovalent Original Booster 08/27/2021, 09/27/2021    DTP / HiB 01/10/1996    Hep B, Adolescent or Pediatric 11/06/2000, 12/07/2000, 05/01/2001    MMR 11/06/2000, 05/01/2001    OPV 01/10/1996    Tdap 03/08/2006, 07/07/2023        Medications:     Current Outpatient Medications:     lisdexamfetamine (VYVANSE) 40 MG capsule, Take 1 capsule by mouth Every Morning, Disp: 30 capsule, Rfl: 0    omeprazole (priLOSEC) 40 MG capsule, TAKE ONE CAPSULE BY MOUTH DAILY BEFORE A MEAL, Disp: 90 capsule, Rfl: 1    Semaglutide-Weight Management 0.25 MG/0.5ML solution auto-injector, Inject 0.5 mL under the skin into the appropriate area as directed Every 7 (Seven) Days., Disp: 2 mL, Rfl: 0    Semaglutide-Weight Management 0.5 MG/0.5ML solution auto-injector, Inject 0.5 mL under the skin into the appropriate area as directed Every 7 (Seven) Days., Disp: 2 mL, Rfl: 0    Allergies:   No Known Allergies    Objective     Vital Signs  There were no vitals taken for this visit.  Estimated body mass index is 70.11 kg/m² as  "calculated from the following:    Height as of 3/14/25: 167.6 cm (66\").    Weight as of 3/14/25: 197 kg (434 lb 6.4 oz).        Physical Exam  Vitals and nursing note reviewed.   Constitutional:       Appearance: Normal appearance.   HENT:      Head: Normocephalic.   Pulmonary:      Effort: Pulmonary effort is normal.   Neurological:      Mental Status: She is alert and oriented to person, place, and time.   Psychiatric:         Mood and Affect: Mood normal.         Behavior: Behavior normal.         Results:  No results found for this or any previous visit (from the past 24 hours).     Assessment and Plan       Diagnoses and all orders for this visit:    1. Attention deficit hyperactivity disorder (ADHD), combined type (Primary)  Assessment & Plan:  - Vyvanse is effective.  - Side effects: constipation and nausea.  - Refill Vyvanse.    Orders:  -     lisdexamfetamine (VYVANSE) 40 MG capsule; Take 1 capsule by mouth Every Morning  Dispense: 30 capsule; Refill: 0    2. Class 3 severe obesity due to excess calories without serious comorbidity with body mass index (BMI) greater than or equal to 70 in adult  Assessment & Plan:   - The patient has lost 11 pounds with Zepbound, but her insurance will no longer cover the medication.  - Initiate Wegovy at 0.25 mg, increasing to 0.5 mg in 4 weeks based on response.   - Follow-up in 8 weeks.    Orders:  -     Semaglutide-Weight Management 0.25 MG/0.5ML solution auto-injector; Inject 0.5 mL under the skin into the appropriate area as directed Every 7 (Seven) Days.  Dispense: 2 mL; Refill: 0  -     Semaglutide-Weight Management 0.5 MG/0.5ML solution auto-injector; Inject 0.5 mL under the skin into the appropriate area as directed Every 7 (Seven) Days.  Dispense: 2 mL; Refill: 0    3. Encounter for long-term current use of medication  Overview:  UDS up-to-date as of 2/14/2025.    CSA signed on 3/14/2025.    Assessment & Plan:  Marlon reviewed and compliant.  Request " #[id number]      4. Gastroesophageal reflux disease, unspecified whether esophagitis present  Assessment & Plan:  - The patient has not taken omeprazole recently but has had symptoms.  - May require omeprazole more frequently due to potential acid reflux post-Wegovy injection.  - Arrange omeprazole prescription if necessary.  - Continue monitoring GERD symptoms.          Follow Up  Return in about 8 weeks (around 7/8/2025) for recheck ADHD and weight management.    Chelsey Holt PA-C  [facility]

## 2025-05-13 NOTE — ASSESSMENT & PLAN NOTE
- The patient has lost 11 pounds with Zepbound, but her insurance will no longer cover the medication.  - Initiate Wegovy at 0.25 mg, increasing to 0.5 mg in 4 weeks based on response.   - Follow-up in 8 weeks.

## 2025-05-13 NOTE — ASSESSMENT & PLAN NOTE
- The patient has not taken omeprazole recently but has had symptoms.  - May require omeprazole more frequently due to potential acid reflux post-Wegovy injection.  - Arrange omeprazole prescription if necessary.  - Continue monitoring GERD symptoms.

## 2025-05-14 ENCOUNTER — TELEPHONE (OUTPATIENT)
Dept: FAMILY MEDICINE CLINIC | Facility: CLINIC | Age: 35
End: 2025-05-14
Payer: COMMERCIAL

## 2025-06-13 DIAGNOSIS — E66.01 CLASS 3 SEVERE OBESITY DUE TO EXCESS CALORIES WITHOUT SERIOUS COMORBIDITY WITH BODY MASS INDEX (BMI) GREATER THAN OR EQUAL TO 70 IN ADULT: ICD-10-CM

## 2025-06-13 DIAGNOSIS — E66.813 CLASS 3 SEVERE OBESITY DUE TO EXCESS CALORIES WITHOUT SERIOUS COMORBIDITY WITH BODY MASS INDEX (BMI) GREATER THAN OR EQUAL TO 70 IN ADULT: ICD-10-CM

## 2025-06-13 DIAGNOSIS — F90.2 ATTENTION DEFICIT HYPERACTIVITY DISORDER (ADHD), COMBINED TYPE: ICD-10-CM

## 2025-06-13 RX ORDER — LISDEXAMFETAMINE DIMESYLATE 40 MG/1
40 CAPSULE ORAL EVERY MORNING
Qty: 30 CAPSULE | Refills: 0 | Status: SHIPPED | OUTPATIENT
Start: 2025-06-13

## 2025-06-13 NOTE — TELEPHONE ENCOUNTER
Caller: Nelson Rendon    Relationship: Self    Best call back number: [phone]    Requested Prescriptions:   Requested Prescriptions     Pending Prescriptions Disp Refills    lisdexamfetamine (VYVANSE) 40 MG capsule 30 capsule 0     Sig: Take 1 capsule by mouth Every Morning    Semaglutide-Weight Management 0.25 MG/0.5ML solution auto-injector 2 mL 0     Sig: Inject 0.5 mL under the skin into the appropriate area as directed Every 7 (Seven) Days.        Pharmacy where request should be sent: [facility] [id number] [id number] [facility] [address] - [phone]  - [phone] FX     Last office visit with prescribing clinician: 3/14/2025   Last telemedicine visit with prescribing clinician: 5/13/2025   Next office visit with prescribing clinician: 6/27/2025     Additional details provided by patient: PATIENT IS NEEDING A EARLY REFILL ON WEGOVY DUE TO ERROR WHEN TRYING TO INJECT. PATIENT HAD TO USE ANOTHER ONE.     Does the patient have less than a 3 day supply:  [x] Yes  [] No    Would you like a call back once the refill request has been completed: [] Yes [x] No    If the office needs to give you a call back, can they leave a voicemail: [] Yes [x] No    Rhonda Mejia   06/13/25 10:31 EDT

## 2025-06-20 ENCOUNTER — TELEPHONE (OUTPATIENT)
Dept: FAMILY MEDICINE CLINIC | Facility: CLINIC | Age: 35
End: 2025-06-20
Payer: COMMERCIAL

## 2025-06-20 NOTE — TELEPHONE ENCOUNTER
PATIENT CAME IN AND STATED SHE TOOK HER INJECTION AND SHE DOESN'T THINK IT WORKED. SHE ASKED IF SHE SHOULD TAKE ANOTHER ONE OR WAIT. WENT AND SPOKE WITH TAMARA AND HALIMA AND THEY ADVISED FOR HER TO JUST WAIT UNTIL THE NEXT WEEK TO TAKE ANOTHER ONE JUST TO BE SAFE. PATIENT VERBALLY UNDERSTOOD

## 2025-06-27 ENCOUNTER — OFFICE VISIT (OUTPATIENT)
Dept: FAMILY MEDICINE CLINIC | Facility: CLINIC | Age: 35
End: 2025-06-27
Payer: COMMERCIAL

## 2025-06-27 VITALS
BODY MASS INDEX: 47.09 KG/M2 | HEIGHT: 66 IN | DIASTOLIC BLOOD PRESSURE: 84 MMHG | OXYGEN SATURATION: 96 % | SYSTOLIC BLOOD PRESSURE: 106 MMHG | HEART RATE: 105 BPM | WEIGHT: 293 LBS

## 2025-06-27 DIAGNOSIS — F90.2 ATTENTION DEFICIT HYPERACTIVITY DISORDER (ADHD), COMBINED TYPE: Primary | ICD-10-CM

## 2025-06-27 DIAGNOSIS — Z79.899 ENCOUNTER FOR LONG-TERM CURRENT USE OF MEDICATION: ICD-10-CM

## 2025-06-27 DIAGNOSIS — E66.813 CLASS 3 SEVERE OBESITY DUE TO EXCESS CALORIES WITHOUT SERIOUS COMORBIDITY WITH BODY MASS INDEX (BMI) GREATER THAN OR EQUAL TO 70 IN ADULT: ICD-10-CM

## 2025-06-27 PROCEDURE — 99214 OFFICE O/P EST MOD 30 MIN: CPT | Performed by: PHYSICIAN ASSISTANT

## 2025-06-27 NOTE — PROGRESS NOTES
Office Note     Name: Nelson Rendon    : 1990     MRN: [id number]     Chief Complaint  ADHD (Follow up)    Subjective   Patient or patient representative verbalized consent for the use of Ambient Listening during the visit with  Chelsey Holt PA-C for chart documentation. 2025  15:59 EDT      Nelson Rendon is a 35 y.o. female.     The patient presents for weight management, nausea, and medication management.    She has administered 3 injections of Wegovy 0.25 mg and is scheduled for the fourth on . She reports nausea 2-3 days post-injection.     Last Friday, she sought medical attention for persistent nausea. Zofran was prescribed but discontinued due to headaches. Phenergan provided relief and improved sleep. She does not take omeprazole daily.    She is on Vyvanse 40 mg, effective in the morning but less so post-lunch. Considering an increased dosage. Found Adderall less effective and agitating.    FAMILY HISTORY  - Mother in skilled nursing home in [place] following a fall      Review of Systems:   Review of Systems   Constitutional:  Negative for appetite change and unexpected weight loss.   HENT:  Negative for sore throat and trouble swallowing.    Cardiovascular:  Negative for palpitations.   Gastrointestinal:  Positive for nausea. Negative for abdominal pain, constipation, diarrhea, vomiting, GERD and indigestion.   Psychiatric/Behavioral:  Negative for agitation, decreased concentration, sleep disturbance and negative for hyperactivity. The patient is not nervous/anxious.        Past Medical History:   Past Medical History:   Diagnosis Date    ADHD (attention deficit hyperactivity disorder)     GERD (gastroesophageal reflux disease)     Obesity        Past Surgical History:   Past Surgical History:   Procedure Laterality Date    CHOLECYSTECTOMY         Family History:   Family History   Problem Relation Age of Onset    Hypertension Father     Obesity Father     Diabetes Brother      "Obesity Brother     Hypertension Brother     Diabetes Other     Obesity Other        Social History:   Social History     Socioeconomic History    Marital status: Single   Tobacco Use    Smoking status: Never     Passive exposure: Never    Smokeless tobacco: Never   Vaping Use    Vaping status: Never Used   Substance and Sexual Activity    Alcohol use: Not Currently     Alcohol/week: 3.0 standard drinks of alcohol    Drug use: Never    Sexual activity: Defer       Immunizations:   Immunization History   Administered Date(s) Administered    COVID-19 (MODERNA) 1st,2nd,3rd Dose Monovalent 08/27/2021, 09/27/2021    COVID-19 (MODERNA) Monovalent Original Booster 08/27/2021, 09/27/2021    DTP / HiB 01/10/1996    Hep B, Adolescent or Pediatric 11/06/2000, 12/07/2000, 05/01/2001    MMR 11/06/2000, 05/01/2001    OPV 01/10/1996    Tdap 03/08/2006, 07/07/2023        Medications:     Current Outpatient Medications:     lisdexamfetamine (VYVANSE) 40 MG capsule, Take 1 capsule by mouth Every Morning, Disp: 30 capsule, Rfl: 0    Semaglutide-Weight Management 0.25 MG/0.5ML solution auto-injector, Inject 0.5 mL under the skin into the appropriate area as directed Every 7 (Seven) Days., Disp: 2 mL, Rfl: 0    Semaglutide-Weight Management 0.5 MG/0.5ML solution auto-injector, Inject 0.5 mL under the skin into the appropriate area as directed Every 7 (Seven) Days. (Patient taking differently: Inject 0.5 mL under the skin into the appropriate area as directed Every 7 (Seven) Days. Hasn't started yet), Disp: 2 mL, Rfl: 0    omeprazole (priLOSEC) 40 MG capsule, TAKE ONE CAPSULE BY MOUTH DAILY BEFORE A MEAL (Patient not taking: Reported on 6/27/2025), Disp: 90 capsule, Rfl: 1    Allergies:   No Known Allergies    Objective     Vital Signs  /84   Pulse 105   Ht 167.6 cm (66\")   Wt (!) 196 kg (431 lb 8 oz)   SpO2 96%   BMI 69.65 kg/m²   Estimated body mass index is 69.65 kg/m² as calculated from the following:    Height as of this " "encounter: 167.6 cm (66\").    Weight as of this encounter: 196 kg (431 lb 8 oz).            Physical Exam  Vitals and nursing note reviewed.   Constitutional:       General: She is not in acute distress.     Appearance: Normal appearance. She is obese. She is not ill-appearing.   HENT:      Head: Normocephalic and atraumatic.   Cardiovascular:      Rate and Rhythm: Normal rate and regular rhythm.      Pulses: Normal pulses.      Heart sounds: Normal heart sounds.   Pulmonary:      Effort: Pulmonary effort is normal. No respiratory distress.      Breath sounds: Normal breath sounds.   Musculoskeletal:      Right lower leg: No edema.      Left lower leg: No edema.   Skin:     General: Skin is warm and dry.   Neurological:      General: No focal deficit present.      Mental Status: She is alert and oriented to person, place, and time.      Coordination: Coordination normal.      Gait: Gait normal.   Psychiatric:         Mood and Affect: Mood normal.         Behavior: Behavior normal.          Results:  No results found for this or any previous visit (from the past 24 hours).       Assessment and Plan       Diagnoses and all orders for this visit:    1. Attention deficit hyperactivity disorder (ADHD), combined type (Primary)  Assessment & Plan:  - Vyvanse effective in the morning but wears off post-lunch.  - Increase Vyvanse to 50 mg for 2 weeks, longer prescription if tolerated.      2. Class 3 severe obesity due to excess calories without serious comorbidity with body mass index (BMI) greater than or equal to 70 in adult  Assessment & Plan:  Patient's (Body mass index is 69.65 kg/m².).  - Continue Wegovy 0.25 mg for another month before increasing to 0.5 mg.  - Prescription refill for Wegovy 0.25 mg provided.  - Monitor portion sizes and avoid greasy or sugary foods to manage nausea.    Orders:  -     Semaglutide-Weight Management 0.25 MG/0.5ML solution auto-injector; Inject 0.5 mL under the skin into the appropriate " area as directed Every 7 (Seven) Days.  Dispense: 2 mL; Refill: 0    3. Encounter for long-term current use of medication  Overview:  UDS up-to-date as of 2/14/2025.    CSA signed on 3/14/2025.    Assessment & Plan:  Marlon SMITH and compliant.  Request #[id number]          Follow Up  Return in about 8 weeks (around 8/22/2025) for next scheduled follow up on ADHD and weight management.    Chelsey Holt PA-C  [facility]

## 2025-06-28 NOTE — ASSESSMENT & PLAN NOTE
- Vyvanse effective in the morning but wears off post-lunch.  - Increase Vyvanse to 50 mg for 2 weeks, longer prescription if tolerated.

## 2025-06-28 NOTE — ASSESSMENT & PLAN NOTE
Patient's (Body mass index is 69.65 kg/m².).  - Continue Wegovy 0.25 mg for another month before increasing to 0.5 mg.  - Prescription refill for Wegovy 0.25 mg provided.  - Monitor portion sizes and avoid greasy or sugary foods to manage nausea.

## 2025-06-30 RX ORDER — LISDEXAMFETAMINE DIMESYLATE 50 MG/1
50 CAPSULE ORAL EVERY MORNING
Qty: 14 CAPSULE | Refills: 0 | Status: SHIPPED | OUTPATIENT
Start: 2025-06-30

## 2025-07-25 DIAGNOSIS — F90.2 ATTENTION DEFICIT HYPERACTIVITY DISORDER (ADHD), COMBINED TYPE: ICD-10-CM

## 2025-07-25 RX ORDER — LISDEXAMFETAMINE DIMESYLATE 40 MG/1
40 CAPSULE ORAL EVERY MORNING
Qty: 30 CAPSULE | Refills: 0 | Status: SHIPPED | OUTPATIENT
Start: 2025-07-25

## 2025-07-25 NOTE — TELEPHONE ENCOUNTER
Caller: Nelson Rendon    Relationship: Self    Best call back number: [phone]    Requested Prescriptions:   Requested Prescriptions     Pending Prescriptions Disp Refills    lisdexamfetamine (VYVANSE) 50 MG capsule 14 capsule 0     Sig: Take 1 capsule by mouth Every Morning        Pharmacy where request should be sent: [facility] [id number] [id number] [facility] [address] - [phone]  - [phone] FX     Last office visit with prescribing clinician: 6/27/2025   Last telemedicine visit with prescribing clinician: 5/13/2025   Next office visit with prescribing clinician: 8/22/2025     Additional details provided by patient: PATIENT WOULD LIKE TO GO BACK TO THE 40MG DOSE      Does the patient have less than a 3 day supply:  [x] Yes  [] No    Would you like a call back once the refill request has been completed: [] Yes [x] No    If the office needs to give you a call back, can they leave a voicemail: [] Yes [x] No    Rhonda Singh Rep   07/25/25 11:18 EDT

## 2025-07-28 DIAGNOSIS — F90.2 ATTENTION DEFICIT HYPERACTIVITY DISORDER (ADHD), COMBINED TYPE: ICD-10-CM

## 2025-07-29 ENCOUNTER — TELEPHONE (OUTPATIENT)
Dept: FAMILY MEDICINE CLINIC | Facility: CLINIC | Age: 35
End: 2025-07-29
Payer: COMMERCIAL

## 2025-07-29 DIAGNOSIS — F90.2 ATTENTION DEFICIT HYPERACTIVITY DISORDER (ADHD), COMBINED TYPE: ICD-10-CM

## 2025-07-29 RX ORDER — LISDEXAMFETAMINE DIMESYLATE 40 MG/1
40 CAPSULE ORAL EVERY MORNING
Qty: 30 CAPSULE | Refills: 0 | OUTPATIENT
Start: 2025-07-29

## 2025-07-29 RX ORDER — LISDEXAMFETAMINE DIMESYLATE 40 MG/1
40 CAPSULE ORAL EVERY MORNING
Qty: 30 CAPSULE | Refills: 0 | Status: SHIPPED | OUTPATIENT
Start: 2025-07-29

## 2025-07-29 NOTE — TELEPHONE ENCOUNTER
Vyvanse 40 mg was refilled on 07/25/2025. Pharmacy did not get Rx because refill class was set print out prescription.

## 2025-07-29 NOTE — TELEPHONE ENCOUNTER
Caller: Nelson Rendon    Relationship: Self    Best call back number: [phone]    What is the best time to reach you: ANYTIME     Who are you requesting to speak with (clinical staff, provider,  specific staff member): CLINICAL STAFF     PATIENT IS WANTING TO SPEAK WITH STAFF ABOUT MEDICATION REFILLS. PLEASE CALL TO DISCUSS.

## 2025-08-25 ENCOUNTER — TELEMEDICINE (OUTPATIENT)
Dept: FAMILY MEDICINE CLINIC | Facility: CLINIC | Age: 35
End: 2025-08-25
Payer: COMMERCIAL

## 2025-08-25 DIAGNOSIS — E78.5 ELEVATED LIPIDS: ICD-10-CM

## 2025-08-25 DIAGNOSIS — Z79.899 ENCOUNTER FOR LONG-TERM CURRENT USE OF MEDICATION: ICD-10-CM

## 2025-08-25 DIAGNOSIS — E66.01 MORBID OBESITY: ICD-10-CM

## 2025-08-25 DIAGNOSIS — F90.2 ATTENTION DEFICIT HYPERACTIVITY DISORDER (ADHD), COMBINED TYPE: ICD-10-CM

## 2025-08-25 DIAGNOSIS — R73.03 PRE-DIABETES: Primary | ICD-10-CM

## 2025-08-25 PROCEDURE — 99214 OFFICE O/P EST MOD 30 MIN: CPT | Performed by: PHYSICIAN ASSISTANT

## 2025-08-25 RX ORDER — SEMAGLUTIDE 1 MG/.5ML
1 INJECTION, SOLUTION SUBCUTANEOUS WEEKLY
Qty: 2 ML | Refills: 0 | Status: SHIPPED | OUTPATIENT
Start: 2025-08-25

## 2025-08-25 RX ORDER — LISDEXAMFETAMINE DIMESYLATE 40 MG/1
40 CAPSULE ORAL EVERY MORNING
Qty: 30 CAPSULE | Refills: 0 | Status: SHIPPED | OUTPATIENT
Start: 2025-08-25